# Patient Record
Sex: MALE | Race: WHITE | Employment: FULL TIME | URBAN - METROPOLITAN AREA
[De-identification: names, ages, dates, MRNs, and addresses within clinical notes are randomized per-mention and may not be internally consistent; named-entity substitution may affect disease eponyms.]

---

## 2017-09-09 ENCOUNTER — HOSPITAL ENCOUNTER (OUTPATIENT)
Dept: CT IMAGING | Age: 57
Discharge: HOME | End: 2017-09-09
Attending: SURGERY
Payer: COMMERCIAL

## 2017-09-09 DIAGNOSIS — R10.9 ABDOMINAL PAIN, UNSPECIFIED LOCATION: ICD-10-CM

## 2017-09-09 PROCEDURE — 74011636320 HC RX REV CODE- 636/320: Performed by: SURGERY

## 2017-09-09 PROCEDURE — 74177 CT ABD & PELVIS W/CONTRAST: CPT

## 2017-09-09 PROCEDURE — 74011000258 HC RX REV CODE- 258: Performed by: SURGERY

## 2017-09-09 RX ORDER — SODIUM CHLORIDE 0.9 % (FLUSH) 0.9 %
10 SYRINGE (ML) INJECTION
Status: COMPLETED | OUTPATIENT
Start: 2017-09-09 | End: 2017-09-09

## 2017-09-09 RX ADMIN — Medication 10 ML: at 09:40

## 2017-09-09 RX ADMIN — DIATRIZOATE MEGLUMINE AND DIATRIZOATE SODIUM 15 ML: 660; 100 LIQUID ORAL; RECTAL at 09:41

## 2017-09-09 RX ADMIN — SODIUM CHLORIDE 100 ML: 900 INJECTION, SOLUTION INTRAVENOUS at 09:40

## 2017-09-09 RX ADMIN — IOPAMIDOL 100 ML: 755 INJECTION, SOLUTION INTRAVENOUS at 09:40

## 2018-07-19 ENCOUNTER — APPOINTMENT (OUTPATIENT)
Dept: CT IMAGING | Age: 58
End: 2018-07-19
Attending: EMERGENCY MEDICINE
Payer: OTHER MISCELLANEOUS

## 2018-07-19 ENCOUNTER — HOSPITAL ENCOUNTER (EMERGENCY)
Age: 58
Discharge: HOME | End: 2018-07-19
Attending: EMERGENCY MEDICINE
Payer: OTHER MISCELLANEOUS

## 2018-07-19 VITALS
RESPIRATION RATE: 18 BRPM | TEMPERATURE: 98 F | SYSTOLIC BLOOD PRESSURE: 128 MMHG | WEIGHT: 180 LBS | BODY MASS INDEX: 28.93 KG/M2 | DIASTOLIC BLOOD PRESSURE: 64 MMHG | OXYGEN SATURATION: 98 % | HEART RATE: 84 BPM | HEIGHT: 66 IN

## 2018-07-19 DIAGNOSIS — S00.03XA CONTUSION OF SCALP, INITIAL ENCOUNTER: Primary | ICD-10-CM

## 2018-07-19 PROCEDURE — 99283 EMERGENCY DEPT VISIT LOW MDM: CPT | Performed by: EMERGENCY MEDICINE

## 2018-07-19 PROCEDURE — 70450 CT HEAD/BRAIN W/O DYE: CPT

## 2018-07-19 NOTE — ED PROVIDER NOTES
HPI Comments: Patient is a 59-year-old male who was at work tonight and accidentally struck his head on the trailer. There is an abrasion on the top of the head. He has a mild throbbing headache. He reports some ringing in his ear. He was sent by his employer to be evaluated for head injury. He denies any loss of consciousness. Patient is a 61 y.o. male presenting with head injury. The history is provided by the patient. Head Injury    The incident occurred 3 to 5 hours ago. He came to the ER via walk-in. The injury mechanism was a direct blow. The volume of blood lost was minimal. The quality of the pain is described as throbbing. The pain has been constant since the injury. Associated symptoms include tinnitus. Pertinent negatives include no blurred vision, no vomiting, no disorientation and no memory loss. He has tried nothing for the symptoms. There was no loss of consciousness. He has been behaving normally. Past Medical History:   Diagnosis Date    Bilateral inguinal hernia     BMI 29.0-29.9,adult     BMI 29.1       Past Surgical History:   Procedure Laterality Date    HX APPENDECTOMY      [facility]    HX ORTHOPAEDIC Right     RIGHT FOOT    HX ORTHOPAEDIC Bilateral     Thumbs         Family History:   Problem Relation Age of Onset    Pulmonary Fibrosis Mother     Lung Disease Mother     Heart Disease Father      CABG, Aortic valve replacement    No Known Problems Sister     No Known Problems Brother        Social History     Social History    Marital status:      Spouse name: N/A    Number of children: N/A    Years of education: N/A     Occupational History    Not on file.      Social History Main Topics    Smoking status: Never Smoker    Smokeless tobacco: Never Used    Alcohol use No    Drug use: No    Sexual activity: Not on file     Other Topics Concern    Not on file     Social History Narrative         ALLERGIES: Review of patient's allergies indicates no known allergies. Review of Systems   Constitutional: Negative. HENT: Positive for tinnitus. Eyes: Negative. Negative for blurred vision. Respiratory: Negative. Cardiovascular: Negative. Gastrointestinal: Negative for vomiting. Endocrine: Negative. Genitourinary: Negative. Neurological: Positive for headaches. Negative for seizures and syncope. Psychiatric/Behavioral: Negative for memory loss. Vitals:    07/19/18 0419   BP: 132/74   Pulse: 89   Resp: 16   Temp: 97.6 °F (36.4 °C)   Weight: 81.6 kg (180 lb)   Height: 5' 6\" (1.676 m)            Physical Exam   Constitutional: He is oriented to person, place, and time. He appears well-developed and well-nourished. HENT:   Head: Normocephalic. Mouth/Throat: Oropharynx is clear and moist.   Abrasion on the top of the head. Eyes: EOM are normal. Pupils are equal, round, and reactive to light. Neck: Normal range of motion. No cervical spine tenderness   Musculoskeletal: Normal range of motion. He exhibits no tenderness or deformity. Neurological: He is alert and oriented to person, place, and time. He has normal strength. No cranial nerve deficit or sensory deficit. GCS eye subscore is 4. GCS verbal subscore is 5. GCS motor subscore is 6. Nursing note and vitals reviewed. MDM  Number of Diagnoses or Management Options  Diagnosis management comments: CAT scan of the head is negative for any acute intracranial injury. Patient is reassured I will give him a note clearing him to return to work. Amount and/or Complexity of Data Reviewed  Tests in the radiology section of CPT®: ordered and reviewed    Risk of Complications, Morbidity, and/or Mortality  Presenting problems: moderate  Diagnostic procedures: moderate  Management options: low    Patient Progress  Patient progress: improved        ED Course     Voice dictation software was used during the making of this note.   This software is not perfect and grammatical and other typographical errors may be present. This note has been proofread, but may still contain errors.   Yohan Shin MD; 7/19/2018 @5:33 AM   ===================================================================      Procedures

## 2018-07-19 NOTE — ED NOTES
I have reviewed discharge instructions with the patient. The patient verbalized understanding. Patient left ED via Discharge Method: ambulatory to Home with (wife). Opportunity for questions and clarification provided. Patient given 0 scripts. To continue your aftercare when you leave the hospital, you may receive an automated call from our care team to check in on how you are doing. This is a free service and part of our promise to provide the best care and service to meet your aftercare needs.  If you have questions, or wish to unsubscribe from this service please call [phone]. Thank you for Choosing our [facility] Emergency Department.

## 2018-07-19 NOTE — LETTER
[facility] EMERGENCY DEPT 
[address] [place] [id number] 
[phone] Work/School Note Date: 7/19/2018 To Whom It May concern: 
 
Maik January III was seen and treated today in the emergency room by the following provider(s): 
Attending Provider: Shon Bustos MD.   
 
Maik January III {Return to school/sport/work: 7/19/2018 Sincerely, Shon Bustos MD

## 2018-07-19 NOTE — DISCHARGE INSTRUCTIONS
Contusion: Care Instructions  Your Care Instructions    Contusion is the medical term for a bruise. It is the result of a direct blow or an impact, such as a fall. Contusions are common sports injuries. Most people think of a bruise as a black-and-blue spot. This happens when small blood vessels get torn and leak blood under the skin. But bones, muscles, and organs can also get bruised. This may damage deep tissues but not cause a bruise you can see. The doctor will do a physical exam to find the location of your contusion. You may also have tests to make sure you do not have a more serious injury, such as a broken bone or nerve damage. These may include X-rays or other imaging tests like a CT scan or MRI. Deep-tissue contusions may cause pain and swelling. But if there is no serious damage, they will often get better in a few weeks with home treatment. The doctor has checked you carefully, but problems can develop later. If you notice any problems or new symptoms, get medical treatment right away. Follow-up care is a key part of your treatment and safety. Be sure to make and go to all appointments, and call your doctor if you are having problems. It's also a good idea to know your test results and keep a list of the medicines you take. How can you care for yourself at home? · Put ice or a cold pack on the sore area for 10 to 20 minutes at a time to stop swelling. Put a thin cloth between the ice pack and your skin. · Be safe with medicines. Read and follow all instructions on the label. ¨ If the doctor gave you a prescription medicine for pain, take it as prescribed. ¨ If you are not taking a prescription pain medicine, ask your doctor if you can take an over-the-counter medicine. · If you can, prop up the sore area on pillows as much as possible for the next few days. Try to keep the sore area above the level of your heart. When should you call for help?   Call your doctor now or seek immediate medical care if:    · Your pain gets worse.     · You have new or worse swelling.     · You have tingling, weakness, or numbness in the area near the contusion.     · The area near the contusion is cold or pale.    Watch closely for changes in your health, and be sure to contact your doctor if:    · You do not get better as expected. Where can you learn more? Go to http://haseeb-danis.info/. Enter G980 in the search box to learn more about \"Contusion: Care Instructions. \"  Current as of: November 20, 2017  Content Version: 11.7  © 6552-8657 [employer]. Care instructions adapted under license by [employer] (which disclaims liability or warranty for this information). If you have questions about a medical condition or this instruction, always ask your healthcare professional. [address] any warranty or liability for your use of this information.

## 2019-11-27 ENCOUNTER — HOSPITAL ENCOUNTER (EMERGENCY)
Age: 59
Discharge: HOME | End: 2019-11-27
Payer: COMMERCIAL

## 2019-11-27 ENCOUNTER — APPOINTMENT (OUTPATIENT)
Dept: GENERAL RADIOLOGY | Age: 59
End: 2019-11-27
Payer: COMMERCIAL

## 2019-11-27 VITALS
SYSTOLIC BLOOD PRESSURE: 151 MMHG | RESPIRATION RATE: 16 BRPM | OXYGEN SATURATION: 96 % | DIASTOLIC BLOOD PRESSURE: 74 MMHG | WEIGHT: 185 LBS | HEART RATE: 59 BPM | HEIGHT: 66 IN | BODY MASS INDEX: 29.73 KG/M2 | TEMPERATURE: 97.7 F
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
## 2019-11-27 DIAGNOSIS — S60.221A CONTUSION OF RIGHT HAND, INITIAL ENCOUNTER: Primary | ICD-10-CM

## 2019-11-27 DIAGNOSIS — S00.83XA CONTUSION OF FACE, INITIAL ENCOUNTER: ICD-10-CM

## 2019-11-27 PROCEDURE — 99283 EMERGENCY DEPT VISIT LOW MDM: CPT

## 2019-11-27 PROCEDURE — 73130 X-RAY EXAM OF HAND: CPT

## 2019-11-27 NOTE — ED NOTES
I have reviewed discharge instructions with the patient. The patient verbalized understanding. Patient left ED via Discharge Method: ambulatory to Home with  self). Opportunity for questions and clarification provided. Patient given 0 scripts. To continue your aftercare when you leave the hospital, you may receive an automated call from our care team to check in on how you are doing. This is a free service and part of our promise to provide the best care and service to meet your aftercare needs.  If you have questions, or wish to unsubscribe from this service please call [phone]. Thank you for Choosing our [facility] Emergency Department.

## 2019-11-27 NOTE — DISCHARGE INSTRUCTIONS
Patient Education        Hand Bruises: Care Instructions  Your Care Instructions  Bruises, or contusions, can happen as a result of an impact or fall. Most people think of a bruise as a black-and-blue spot. This happens when small blood vessels get torn and leak blood under the skin. The bruise may turn purplish black, reddish blue, or yellowish green as it heals. But bones and muscles can also get bruised. This may damage the hand but not cause a bruise that you can see. Most bruises aren't serious and will go away on their own in 2 to 4 weeks. But sometimes a more serious hand injury might not heal on its own. Tell your doctor if you have new symptoms or your injury is not getting better over time. You may have tests to see if you have bone or nerve damage. These tests may include X-rays, a CT scan, or an MRI. If you damaged bones or muscles, you may need more treatment. The doctor has checked you carefully, but problems can develop later. If you notice any problems or new symptoms, get medical treatment right away. Follow-up care is a key part of your treatment and safety. Be sure to make and go to all appointments, and call your doctor if you are having problems. It's also a good idea to know your test results and keep a list of the medicines you take. How can you care for yourself at home? · Put ice or a cold pack on the hand for 10 to 20 minutes at a time. Put a thin cloth between the ice and your skin. · Prop up your hand on a pillow when you ice it or anytime you sit or lie down during the next 3 days. Try to keep your hand above the level of your heart. This will help reduce swelling. · Be safe with medicines. Read and follow all instructions on the label. ? If the doctor gave you a prescription medicine for pain, take it as prescribed. ? If you are not taking a prescription pain medicine, ask your doctor if you can take an over-the-counter medicine.   · Be sure to follow your doctor's advice about moving and exercising your injured hand. When should you call for help? Call your doctor now or seek immediate medical care if:    · Your pain gets worse.     · You have new or worse swelling.     · You have tingling, weakness, or numbness in the area near the bruise.     · The area near the bruise is cold or pale.     · You have symptoms of infection, such as:  ? Increased pain, swelling, warmth, or redness. ? Red streaks leading from the area. ? Pus draining from the area. ? A fever.    Watch closely for changes in your health, and be sure to contact your doctor if:    · You do not get better as expected. Where can you learn more? Go to http://haseeb-danis.info/. Enter H134 in the search box to learn more about \"Hand Bruises: Care Instructions. \"  Current as of: June 26, 2019  Content Version: 12.2  © 7147-1623 [employer]. Care instructions adapted under license by [employer] (which disclaims liability or warranty for this information). If you have questions about a medical condition or this instruction, always ask your healthcare professional. [place] [address] any warranty or liability for your use of this information. Patient Education        Preventing Outdoor Falls: Care Instructions  Your Care Instructions    Worries about falls don't need to keep you indoors. Outdoor activities like walking have big benefits for your health. You will need to watch your step and learn a few safety measures. If you are worried about having a fall outdoors, ask your doctor about exercises, classes, or physical therapy that may help. You can learn ways to gain strength, flexibility, and balance. Ask if it might help to use a cane or walker. You can make your time outdoors safer with a few simple measures. Follow-up care is a key part of your treatment and safety.  Be sure to make and go to all appointments, and call your doctor if you are having problems. It's also a good idea to know your test results and keep a list of the medicines you take. How can you prevent falls outdoors? · Wear shoes with firm soles and low heels. If you have to walk on an icy surface, use grippers that can be worn over your shoes in bad weather. · Be extra careful if weather is bad. Walk on the grass when the sidewalks are slick. If you live in a place that gets snow and ice in the winter, sprinkle salt on slippery stairs and sidewalks. · Be careful getting on or off buses and trains or getting in and out of cars. If handrails are available, use them. · Be careful when you cross the street. Look for crosswalks or places where curb cuts or ramps are present. · Try not to hurry, especially if you are carrying something. · Be cautious in parking lots or garages. There may be curbs or changes in pavement, or the height of the pavement may vary. · Make sure to wear the correct eyeglasses, if you need them. Reading glasses or bifocals can make it harder to see hazards that might be in your way. · If you are walking outdoors for exercise, try to:  ? Walk in well-lighted, well-maintained areas. These include high school or college tracks, shopping malls, and public spaces. ? Walk with a partner. ? Watch out for cracked sidewalks, curbs, changes in the height of the pavement, exposed tree roots, and debris such as fallen leaves or branches. Where can you learn more? Go to http://haseeb-danis.info/. Enter T770 in the search box to learn more about \"Preventing Outdoor Falls: Care Instructions. \"  Current as of: November 7, 2018  Content Version: 12.2  © 0614-3593 [employer]. Care instructions adapted under license by [employer] (which disclaims liability or warranty for this information).  If you have questions about a medical condition or this instruction, always ask your healthcare professional. [place] [address] any warranty or liability for your use of this information.

## 2019-11-27 NOTE — ED TRIAGE NOTES
Pt states that he tripped and fell tonight.   C/o right hand pain with abrasions and right sided facial pain with abrasions

## 2019-11-27 NOTE — ED PROVIDER NOTES
76-year-old male tripped in the parking lot leaving work falling on his right hand he is left-hand dominant and hitting his right cheek on the parking lot asphalt. No loss of consciousness abrasions or bleedings under control. Patient's main complaint is tightness and pain in his right hand. Fall   The accident occurred less than 1 hour ago. The fall occurred while walking. He fell from a height of ground level. He landed on concrete. The volume of blood lost was minimal. The point of impact was the head. The pain is present in the head (Right hand). The pain is at a severity of 6/10. The pain is moderate. He was ambulatory at the scene. Pertinent negatives include no numbness, no headaches, no extremity weakness, no loss of consciousness and no tingling.         Past Medical History:   Diagnosis Date    Bilateral inguinal hernia     BMI 29.0-29.9,adult     BMI 29.1       Past Surgical History:   Procedure Laterality Date    HX APPENDECTOMY      [facility]    HX ORTHOPAEDIC Right     RIGHT FOOT    HX ORTHOPAEDIC Bilateral     Thumbs         Family History:   Problem Relation Age of Onset    Pulmonary Fibrosis Mother     Lung Disease Mother     Heart Disease Father         CABG, Aortic valve replacement    No Known Problems Sister     No Known Problems Brother        Social History     Socioeconomic History    Marital status:      Spouse name: Not on file    Number of children: Not on file    Years of education: Not on file    Highest education level: Not on file   Occupational History    Not on file   Social Needs    Financial resource strain: Not on file    Food insecurity:     Worry: Not on file     Inability: Not on file    Transportation needs:     Medical: Not on file     Non-medical: Not on file   Tobacco Use    Smoking status: Never Smoker    Smokeless tobacco: Never Used   Substance and Sexual Activity    Alcohol use: No    Drug use: No    Sexual activity: Not on file   Lifestyle    Physical activity:     Days per week: Not on file     Minutes per session: Not on file    Stress: Not on file   Relationships    Social connections:     Talks on phone: Not on file     Gets together: Not on file     Attends [employer]ism service: Not on file     Active member of club or organization: Not on file     Attends meetings of clubs or organizations: Not on file     Relationship status: Not on file    Intimate partner violence:     Fear of current or ex partner: Not on file     Emotionally abused: Not on file     Physically abused: Not on file     Forced sexual activity: Not on file   Other Topics Concern    Not on file   Social History Narrative    Not on file         ALLERGIES: Patient has no known allergies. Review of Systems   Constitutional: Negative. Negative for activity change. HENT: Negative. Eyes: Negative. Respiratory: Negative. Cardiovascular: Negative. Gastrointestinal: Negative. Genitourinary: Negative. Musculoskeletal: Negative. Negative for extremity weakness. Skin: Negative. Neurological: Negative. Negative for tingling, loss of consciousness, numbness and headaches. Psychiatric/Behavioral: Negative. All other systems reviewed and are negative. Vitals:    11/27/19 0416   BP: 151/74   Pulse: (!) 59   Resp: 16   Temp: 97.7 °F (36.5 °C)   SpO2: 96%   Weight: 83.9 kg (185 lb)   Height: 5' 6\" (1.676 m)            Physical Exam  Vitals signs and nursing note reviewed. Constitutional:       General: He is not in acute distress. Appearance: He is well-developed. He is not diaphoretic. HENT:      Head: Normocephalic. Abrasion present. Right Ear: External ear normal.      Left Ear: External ear normal.      Nose: Nose normal.      Mouth/Throat:      Pharynx: No oropharyngeal exudate. Eyes:      General: No scleral icterus. Right eye: No discharge. Left eye: No discharge. Conjunctiva/sclera: Conjunctivae normal.      Pupils: Pupils are equal, round, and reactive to light. Neck:      Musculoskeletal: Normal range of motion and neck supple. Vascular: No JVD. Trachea: No tracheal deviation. Cardiovascular:      Rate and Rhythm: Normal rate and regular rhythm. Pulmonary:      Effort: Pulmonary effort is normal. No respiratory distress. Breath sounds: Normal breath sounds. No stridor. No wheezing. Chest:      Chest wall: No tenderness. Abdominal:      General: Bowel sounds are normal. There is no distension. Palpations: Abdomen is soft. There is no mass. Tenderness: There is no tenderness. Musculoskeletal: Normal range of motion. Right hand: He exhibits tenderness and swelling. Hands:    Skin:     General: Skin is warm and dry. Coloration: Skin is not pale. Findings: No erythema or rash. Neurological:      Mental Status: He is alert and oriented to person, place, and time. Cranial Nerves: No cranial nerve deficit. Psychiatric:         Behavior: Behavior normal.         Thought Content: Thought content normal.          MDM  Number of Diagnoses or Management Options  Contusion of face, initial encounter: minor  Contusion of right hand, initial encounter: minor  Fall, initial encounter: minor  Diagnosis management comments: Patient had a trip and fall he did not have any loss of consciousness he did not feel dizzy before the fall. Patient landed on his right hand and his right cheek he has abrasions at both spots ulcers come swelling. X-rays of the hand were negative for fractures. Extraocular movements are intact no instability noted in facial bones.        Amount and/or Complexity of Data Reviewed  Tests in the radiology section of CPT®: ordered and reviewed    Risk of Complications, Morbidity, and/or Mortality  Presenting problems: low  Diagnostic procedures: low  Management options: low    Patient Progress  Patient progress: stable         Procedures

## 2020-11-24 ENCOUNTER — HOSPITAL ENCOUNTER (OUTPATIENT)
Dept: ULTRASOUND IMAGING | Age: 60
Discharge: HOME | End: 2020-11-24
Attending: FAMILY MEDICINE
Payer: COMMERCIAL

## 2020-11-24 DIAGNOSIS — D69.6 THROMBOCYTOPENIA (HCC): ICD-10-CM

## 2020-11-24 DIAGNOSIS — R74.8 ABNORMAL LIVER ENZYMES: ICD-10-CM

## 2020-11-24 PROCEDURE — 76700 US EXAM ABDOM COMPLETE: CPT

## 2020-11-24 NOTE — PROGRESS NOTES
Note that he has A LARGE NUMBER OF HEPATIC CYSTS WHICH ARE SEPTATED---GALLSTONES PRESENT----FATTY LIVER-----I RECOMMEND GI REFERRAL-----I CAN GO AHEAD AND DO THIS OR HE CAN COME IN TO DISCUSS FOR APPT

## 2021-02-01 ENCOUNTER — TRANSCRIBE ORDER (OUTPATIENT)
Dept: SCHEDULING | Age: 61
End: 2021-02-01

## 2021-02-01 DIAGNOSIS — R74.01 ELEVATED ALT MEASUREMENT: ICD-10-CM

## 2021-02-01 DIAGNOSIS — E80.6 OTHER DISORDERS OF BILIRUBIN METABOLISM: ICD-10-CM

## 2021-02-01 DIAGNOSIS — R74.01 ELEVATION OF LEVELS OF LIVER TRANSAMINASE LEVELS: Primary | ICD-10-CM

## 2021-02-01 DIAGNOSIS — R93.2 ABNORMAL ULTRASOUND OF LIVER: Primary | ICD-10-CM

## 2021-02-01 DIAGNOSIS — R93.2 ABNORMAL FINDINGS ON DIAGNOSTIC IMAGING OF LIVER AND BILIARY TRACT: ICD-10-CM

## 2021-02-02 ENCOUNTER — TRANSCRIBE ORDER (OUTPATIENT)
Dept: SCHEDULING | Age: 61
End: 2021-02-02

## 2021-02-02 DIAGNOSIS — R93.2 ABNORMAL ULTRASOUND OF LIVER: Primary | ICD-10-CM

## 2021-02-02 DIAGNOSIS — R74.01 ELEVATED ALT MEASUREMENT: ICD-10-CM

## 2021-02-10 ENCOUNTER — HOSPITAL ENCOUNTER (OUTPATIENT)
Dept: MRI IMAGING | Age: 61
Discharge: HOME | End: 2021-02-10
Attending: PHYSICIAN ASSISTANT
Payer: COMMERCIAL

## 2021-02-10 DIAGNOSIS — R74.01 ELEVATED ALT MEASUREMENT: ICD-10-CM

## 2021-02-10 DIAGNOSIS — R93.2 ABNORMAL ULTRASOUND OF LIVER: ICD-10-CM

## 2021-02-10 PROCEDURE — A9575 INJ GADOTERATE MEGLUMI 0.1ML: HCPCS | Performed by: PHYSICIAN ASSISTANT

## 2021-02-10 PROCEDURE — 74011250636 HC RX REV CODE- 250/636: Performed by: PHYSICIAN ASSISTANT

## 2021-02-10 PROCEDURE — 74011000258 HC RX REV CODE- 258: Performed by: PHYSICIAN ASSISTANT

## 2021-02-10 PROCEDURE — 74183 MRI ABD W/O CNTR FLWD CNTR: CPT

## 2021-02-10 RX ORDER — GADOTERATE MEGLUMINE 376.9 MG/ML
16 INJECTION INTRAVENOUS
Status: COMPLETED | OUTPATIENT
Start: 2021-02-10 | End: 2021-02-10

## 2021-02-10 RX ORDER — SODIUM CHLORIDE 0.9 % (FLUSH) 0.9 %
10 SYRINGE (ML) INJECTION
Status: COMPLETED | OUTPATIENT
Start: 2021-02-10 | End: 2021-02-10

## 2021-02-10 RX ADMIN — SODIUM CHLORIDE 100 ML: 900 INJECTION, SOLUTION INTRAVENOUS at 10:48

## 2021-02-10 RX ADMIN — Medication 10 ML: at 10:48

## 2021-02-10 RX ADMIN — GADOTERATE MEGLUMINE 16 ML: 376.9 INJECTION INTRAVENOUS at 10:48

## 2021-07-22 PROBLEM — Z12.11 ENCOUNTER FOR SCREENING FOR MALIGNANT NEOPLASM OF COLON: Status: ACTIVE | Noted: 2019-02-12

## 2021-10-27 ENCOUNTER — HOSPITAL ENCOUNTER (OUTPATIENT)
Dept: GENERAL RADIOLOGY | Age: 61
Discharge: HOME | End: 2021-10-27
Payer: COMMERCIAL

## 2021-10-27 DIAGNOSIS — R20.0 NUMBNESS AND TINGLING OF LEFT LEG: ICD-10-CM

## 2021-10-27 DIAGNOSIS — R20.2 NUMBNESS AND TINGLING OF LEFT LEG: ICD-10-CM

## 2021-10-27 PROCEDURE — 72110 X-RAY EXAM L-2 SPINE 4/>VWS: CPT

## 2021-10-27 NOTE — PROGRESS NOTES
MILD DEGENERATIVE ARTHRITIS IN THE LOWER SPINE===MAKE SURE TO KEEP FOLOW UP IN 6 WEEKS IF STILL HAVING PROBLEMS WITH BACK AS WE MAY NEED TO DO FURTHER STUDIES.

## 2021-11-17 ENCOUNTER — HOSPITAL ENCOUNTER (OUTPATIENT)
Dept: MRI IMAGING | Age: 61
Discharge: HOME | End: 2021-11-17
Attending: FAMILY MEDICINE
Payer: COMMERCIAL

## 2021-11-17 DIAGNOSIS — R20.0 NUMBNESS AND TINGLING OF LEFT LEG: ICD-10-CM

## 2021-11-17 DIAGNOSIS — M54.16 LUMBAR RADICULOPATHY: ICD-10-CM

## 2021-11-17 DIAGNOSIS — M51.37 DEGENERATIVE DISC DISEASE AT L5-S1 LEVEL: ICD-10-CM

## 2021-11-17 DIAGNOSIS — R20.2 NUMBNESS AND TINGLING OF LEFT LEG: ICD-10-CM

## 2021-11-17 PROCEDURE — 72148 MRI LUMBAR SPINE W/O DYE: CPT

## 2022-02-09 PROBLEM — R53.83 OTHER FATIGUE: Status: ACTIVE | Noted: 2022-02-09

## 2022-02-09 PROBLEM — R00.1 BRADYCARDIA ON ECG: Status: ACTIVE | Noted: 2022-02-09

## 2022-02-09 PROBLEM — R00.1 BRADYCARDIA: Status: ACTIVE | Noted: 2022-02-09

## 2022-02-09 PROBLEM — R94.31 EKG ABNORMALITIES: Status: ACTIVE | Noted: 2022-02-09

## 2022-02-09 PROBLEM — Z82.49 FAMILY HISTORY OF ISCHEMIC HEART DISEASE (IHD): Status: ACTIVE | Noted: 2022-02-09

## 2022-02-24 ENCOUNTER — HOSPITAL ENCOUNTER (OUTPATIENT)
Dept: CT IMAGING | Age: 62
Discharge: HOME | End: 2022-02-24
Attending: INTERNAL MEDICINE
Payer: COMMERCIAL

## 2022-02-24 DIAGNOSIS — R53.83 OTHER FATIGUE: ICD-10-CM

## 2022-02-24 DIAGNOSIS — R94.31 EKG ABNORMALITIES: ICD-10-CM

## 2022-02-24 PROCEDURE — 75571 CT HRT W/O DYE W/CA TEST: CPT

## 2022-02-25 NOTE — PROGRESS NOTES
PLease call him, Ca score showed some build up, moderate, not critical.  Would like him to get on ASA 81 daily. Cancel the stress echo, we need a better stress test.   Let's order a NST and echo. See me after testing.    Thanks

## 2022-03-18 PROBLEM — Z82.49 FAMILY HISTORY OF ISCHEMIC HEART DISEASE (IHD): Status: ACTIVE | Noted: 2022-02-09

## 2022-03-18 PROBLEM — R00.1 BRADYCARDIA: Status: ACTIVE | Noted: 2022-02-09

## 2022-03-19 PROBLEM — Z12.11 ENCOUNTER FOR SCREENING FOR MALIGNANT NEOPLASM OF COLON: Status: ACTIVE | Noted: 2019-02-12

## 2022-03-19 PROBLEM — R94.31 EKG ABNORMALITIES: Status: ACTIVE | Noted: 2022-02-09

## 2022-03-19 PROBLEM — R53.83 OTHER FATIGUE: Status: ACTIVE | Noted: 2022-02-09

## 2022-03-19 PROBLEM — R00.1 BRADYCARDIA ON ECG: Status: ACTIVE | Noted: 2022-02-09

## 2022-05-19 PROBLEM — I25.10 CORONARY ARTERY DISEASE INVOLVING NATIVE CORONARY ARTERY OF NATIVE HEART WITHOUT ANGINA PECTORIS: Status: ACTIVE | Noted: 2022-05-19

## 2022-07-12 ENCOUNTER — OFFICE VISIT (OUTPATIENT)
Dept: FAMILY MEDICINE CLINIC | Facility: CLINIC | Age: 62
End: 2022-07-12
Payer: COMMERCIAL

## 2022-07-12 VITALS
SYSTOLIC BLOOD PRESSURE: 130 MMHG | BODY MASS INDEX: 31.76 KG/M2 | WEIGHT: 197.6 LBS | OXYGEN SATURATION: 96 % | HEART RATE: 57 BPM | DIASTOLIC BLOOD PRESSURE: 78 MMHG | HEIGHT: 66 IN | TEMPERATURE: 97.3 F

## 2022-07-12 DIAGNOSIS — M79.641 BILATERAL HAND PAIN: ICD-10-CM

## 2022-07-12 DIAGNOSIS — L08.9 SKIN INFECTION: ICD-10-CM

## 2022-07-12 DIAGNOSIS — R06.09 DOE (DYSPNEA ON EXERTION): Primary | ICD-10-CM

## 2022-07-12 DIAGNOSIS — S20.161A INSECT BITE OF RIGHT BREAST, INITIAL ENCOUNTER: ICD-10-CM
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
## 2022-07-12 DIAGNOSIS — E78.49 OTHER HYPERLIPIDEMIA: ICD-10-CM

## 2022-07-12 DIAGNOSIS — R00.1 BRADYCARDIA: ICD-10-CM

## 2022-07-12 DIAGNOSIS — M79.642 BILATERAL HAND PAIN: ICD-10-CM

## 2022-07-12 LAB
ALBUMIN SERPL-MCNC: 4 G/DL (ref 3.2–4.6)
ALBUMIN/GLOB SERPL: 1.5 {RATIO} (ref 1.2–3.5)
ALP SERPL-CCNC: 130 U/L (ref 50–136)
ALT SERPL-CCNC: 74 U/L (ref 12–65)
ANION GAP SERPL CALC-SCNC: 4 MMOL/L (ref 7–16)
AST SERPL-CCNC: 28 U/L (ref 15–37)
BASOPHILS # BLD: 0 K/UL (ref 0–0.2)
BASOPHILS NFR BLD: 1 % (ref 0–2)
BILIRUB SERPL-MCNC: 1.4 MG/DL (ref 0.2–1.1)
BUN SERPL-MCNC: 23 MG/DL (ref 8–23)
CALCIUM SERPL-MCNC: 9.6 MG/DL (ref 8.3–10.4)
CHLORIDE SERPL-SCNC: 111 MMOL/L (ref 98–107)
CHOLEST SERPL-MCNC: 191 MG/DL
CO2 SERPL-SCNC: 27 MMOL/L (ref 21–32)
CREAT SERPL-MCNC: 1.1 MG/DL (ref 0.8–1.5)
DIFFERENTIAL METHOD BLD: ABNORMAL
EOSINOPHIL # BLD: 0.1 K/UL (ref 0–0.8)
EOSINOPHIL NFR BLD: 1 % (ref 0.5–7.8)
ERYTHROCYTE [DISTWIDTH] IN BLOOD BY AUTOMATED COUNT: 12.8 % (ref 11.9–14.6)
GLOBULIN SER CALC-MCNC: 2.7 G/DL (ref 2.3–3.5)
GLUCOSE SERPL-MCNC: 90 MG/DL (ref 65–100)
HCT VFR BLD AUTO: 48.5 % (ref 41.1–50.3)
HDLC SERPL-MCNC: 75 MG/DL (ref 40–60)
HDLC SERPL: 2.5 {RATIO}
HGB BLD-MCNC: 16.2 G/DL (ref 13.6–17.2)
IMM GRANULOCYTES # BLD AUTO: 0 K/UL (ref 0–0.5)
IMM GRANULOCYTES NFR BLD AUTO: 1 % (ref 0–5)
LDLC SERPL CALC-MCNC: 95.8 MG/DL
LYMPHOCYTES # BLD: 1.6 K/UL (ref 0.5–4.6)
LYMPHOCYTES NFR BLD: 29 % (ref 13–44)
MCH RBC QN AUTO: 29.9 PG (ref 26.1–32.9)
MCHC RBC AUTO-ENTMCNC: 33.4 G/DL (ref 31.4–35)
MCV RBC AUTO: 89.5 FL (ref 79.6–97.8)
MONOCYTES # BLD: 0.6 K/UL (ref 0.1–1.3)
MONOCYTES NFR BLD: 10 % (ref 4–12)
NEUTS SEG # BLD: 3.2 K/UL (ref 1.7–8.2)
NEUTS SEG NFR BLD: 58 % (ref 43–78)
NRBC # BLD: 0 K/UL (ref 0–0.2)
PLATELET # BLD AUTO: 136 K/UL (ref 150–450)
PMV BLD AUTO: 12.6 FL (ref 9.4–12.3)
POTASSIUM SERPL-SCNC: 4.5 MMOL/L (ref 3.5–5.1)
PROT SERPL-MCNC: 6.7 G/DL (ref 6.3–8.2)
RBC # BLD AUTO: 5.42 M/UL (ref 4.23–5.6)
SODIUM SERPL-SCNC: 142 MMOL/L (ref 138–145)
TRIGL SERPL-MCNC: 101 MG/DL (ref 35–150)
VLDLC SERPL CALC-MCNC: 20.2 MG/DL (ref 6–23)
WBC # BLD AUTO: 5.6 K/UL (ref 4.3–11.1)

## 2022-07-12 PROCEDURE — 99214 OFFICE O/P EST MOD 30 MIN: CPT | Performed by: FAMILY MEDICINE

## 2022-07-12 RX ORDER — MUPIROCIN CALCIUM 20 MG/G
CREAM TOPICAL
Qty: 1 EACH | Refills: 0 | Status: SHIPPED | OUTPATIENT
Start: 2022-07-12 | End: 2022-08-11

## 2022-07-12 RX ORDER — DOXYCYCLINE HYCLATE 100 MG
100 TABLET ORAL 2 TIMES DAILY
Qty: 14 TABLET | Refills: 0 | Status: SHIPPED | OUTPATIENT
Start: 2022-07-12 | End: 2022-07-19

## 2022-07-12 ASSESSMENT — PATIENT HEALTH QUESTIONNAIRE - PHQ9
SUM OF ALL RESPONSES TO PHQ QUESTIONS 1-9: 0
SUM OF ALL RESPONSES TO PHQ QUESTIONS 1-9: 0
SUM OF ALL RESPONSES TO PHQ9 QUESTIONS 1 & 2: 0
1. LITTLE INTEREST OR PLEASURE IN DOING THINGS: 0
SUM OF ALL RESPONSES TO PHQ QUESTIONS 1-9: 0
SUM OF ALL RESPONSES TO PHQ QUESTIONS 1-9: 0
2. FEELING DOWN, DEPRESSED OR HOPELESS: 0

## 2022-07-13 ASSESSMENT — ENCOUNTER SYMPTOMS
SORE THROAT: 0
BLOOD IN STOOL: 0
PHOTOPHOBIA: 0
ABDOMINAL DISTENTION: 0
ABDOMINAL PAIN: 0
SHORTNESS OF BREATH: 1
EYE PAIN: 0
COUGH: 0
NAUSEA: 0
COLOR CHANGE: 0

## 2022-07-13 NOTE — PROGRESS NOTES
Valerie Pierce III  1960 is a 57 y.o. male ,Established patient, here for evaluation of the following chief complaint(s):   Chief Complaint   Patient presents with    3 Month Follow-Up    Cholesterol Problem     pt is not fasting    Hand Pain     having left hand pain no injury to the hand pt states he thinks its arthritis describes it as a burning pain    Shortness of Breath     for about a week now he has had some sob while walking going up stairs pt thinks maybe its the weight he has put on but he does have a cough at times with this          1. VELASQUEZ (dyspnea on exertion)-has had complete cardiac work up recently and this has been negative-- no know exposures to dust or chemicals --WILL DO CXRAY AND REFER TO PULMONARY AS HE MAY NEED PFTS AND FURTHER EVALUATION. -     XR CHEST PA LAT (2 VIEWS); Future  -     CBC with Auto Differential; Future  -     Comprehensive Metabolic Panel; Future  -     Vero Madrid MD, Pulmonology, [facility]  2. Other hyperlipidemia  -     Lipid Panel; Future  3. Bilateral hand pain  -     XR HAND LEFT PA; Future  -     XR HAND RIGHT PA; Future  4. Insect bite of right breast, initial encounter  -     doxycycline hyclate (VIBRA-TABS) 100 MG tablet; Take 1 tablet by mouth 2 times daily for 7 days, Disp-14 tablet, R-0Normal  -     mupirocin (BACTROBAN) 2 % cream; Apply 3 times daily. , Disp-1 each, R-0, Normal  5. Skin infection  -     doxycycline hyclate (VIBRA-TABS) 100 MG tablet; Take 1 tablet by mouth 2 times daily for 7 days, Disp-14 tablet, R-0Normal  -     mupirocin (BACTROBAN) 2 % cream; Apply 3 times daily. , Disp-1 each, R-0, Normal  6. Bradycardia  Assessment & Plan:   Monitored by specialist- no acute findings meriting change in the plan--HE HAS PLQ BURDEN 295 LVEF 67 PERCENT LEFT VENTRICULAR PERFUSION NORMAL        Return in about 3 months (around 10/12/2022).         Subjective   SUBJECTIVE/OBJECTIVE:  An insect bit him on side of chest. It is now red.    Hyperlipidemia  This is a chronic problem. The current episode started more than 1 year ago. The problem is controlled. Recent lipid tests were reviewed and are normal. He has no history of chronic renal disease or diabetes. There are no known factors aggravating his hyperlipidemia. Associated symptoms include shortness of breath. Pertinent negatives include no chest pain or myalgias. The current treatment provides mild improvement of lipids. Hand Pain   The incident occurred more than 1 week ago (both hands no incident). There was no injury mechanism. The quality of the pain is described as aching. The pain is at a severity of 4/10. The pain is moderate. The pain has been fluctuating since the incident. Pertinent negatives include no chest pain or muscle weakness. The symptoms are aggravated by movement. He has tried nothing for the symptoms. The treatment provided no relief. Shortness of Breath  Pertinent negatives include no abdominal pain, chest pain, ear pain, fever, headaches, leg swelling, rash or sore throat. Review of Systems   Constitutional: Negative for chills and fever. HENT: Negative for ear pain, hearing loss and sore throat. Eyes: Negative for photophobia and pain. Respiratory: Positive for shortness of breath. Negative for cough. Cardiovascular: Negative for chest pain, palpitations and leg swelling. Gastrointestinal: Negative for abdominal distention, abdominal pain, blood in stool and nausea. Genitourinary: Negative for dysuria and urgency. Musculoskeletal: Negative for joint swelling and myalgias. Skin: Negative for color change, pallor and rash. Neurological: Negative for speech difficulty, weakness, light-headedness and headaches. Hematological: Negative for adenopathy. Psychiatric/Behavioral: Negative for agitation, confusion, hallucinations, self-injury and suicidal ideas.           Objective   Physical Exam  Constitutional:       Appearance: Normal appearance. HENT:      Head: Normocephalic and atraumatic. Nose: Nose normal.   Eyes:      Extraocular Movements: Extraocular movements intact. Conjunctiva/sclera: Conjunctivae normal.      Pupils: Pupils are equal, round, and reactive to light. Cardiovascular:      Rate and Rhythm: Normal rate and regular rhythm. Pulses: Normal pulses. Heart sounds: Normal heart sounds. Pulmonary:      Effort: Pulmonary effort is normal.      Breath sounds: Normal breath sounds. Abdominal:      General: Abdomen is flat. Bowel sounds are normal.      Palpations: Abdomen is soft. Skin:     General: Skin is warm and dry. Neurological:      General: No focal deficit present. Mental Status: He is alert and oriented to person, place, and time. Psychiatric:         Mood and Affect: Mood normal.                   An electronic signature was used to authenticate this note.     --Luis Alberto Alvarez MD

## 2022-07-13 NOTE — ASSESSMENT & PLAN NOTE
Monitored by specialist- no acute findings meriting change in the plan--HE HAS PLQ BURDEN 295 LVEF 67 PERCENT LEFT VENTRICULAR PERFUSION NORMAL
Abdomen soft, non-tender, gravid uterus. feeling baby move

## 2022-07-14 ENCOUNTER — HOSPITAL ENCOUNTER (OUTPATIENT)
Dept: GENERAL RADIOLOGY | Age: 62
Discharge: HOME | End: 2022-07-17
Payer: COMMERCIAL

## 2022-07-14 DIAGNOSIS — M79.642 BILATERAL HAND PAIN: ICD-10-CM

## 2022-07-14 DIAGNOSIS — R06.09 DOE (DYSPNEA ON EXERTION): ICD-10-CM

## 2022-07-14 DIAGNOSIS — M79.641 BILATERAL HAND PAIN: ICD-10-CM

## 2022-07-14 PROCEDURE — 71046 X-RAY EXAM CHEST 2 VIEWS: CPT

## 2022-07-14 PROCEDURE — 73120 X-RAY EXAM OF HAND: CPT

## 2022-08-26 ENCOUNTER — OFFICE VISIT (OUTPATIENT)
Dept: PULMONOLOGY | Age: 62
End: 2022-08-26
Payer: COMMERCIAL

## 2022-08-26 VITALS
BODY MASS INDEX: 31.18 KG/M2 | HEART RATE: 58 BPM | SYSTOLIC BLOOD PRESSURE: 134 MMHG | TEMPERATURE: 97.4 F | DIASTOLIC BLOOD PRESSURE: 74 MMHG | HEIGHT: 66 IN | OXYGEN SATURATION: 99 % | WEIGHT: 194 LBS

## 2022-08-26 DIAGNOSIS — R06.02 SOB (SHORTNESS OF BREATH): Primary | ICD-10-CM

## 2022-08-26 LAB
EXPIRATORY TIME: NORMAL
FEF 25-75% %PRED-PRE: NORMAL
FEF 25-75% PRED: NORMAL
FEF 25-75%-PRE: NORMAL
FEV1 %PRED-PRE: 64 %
FEV1 PRED: NORMAL
FEV1/FVC %PRED-PRE: NORMAL
FEV1/FVC PRED: NORMAL
FEV1/FVC: 74 %
FEV1: 1.93 L
FVC %PRED-PRE: 64 %
FVC PRED: NORMAL
FVC: 2.6 L
PEF %PRED-PRE: NORMAL
PEF PRED: NORMAL
PEF-PRE: NORMAL

## 2022-08-26 PROCEDURE — 94010 BREATHING CAPACITY TEST: CPT | Performed by: INTERNAL MEDICINE

## 2022-08-26 PROCEDURE — 99204 OFFICE O/P NEW MOD 45 MIN: CPT | Performed by: INTERNAL MEDICINE

## 2022-08-26 ASSESSMENT — PULMONARY FUNCTION TESTS
FEV1_PERCENT_PREDICTED_PRE: 64
FEV1/FVC: 74
FEV1: 1.93
FVC: 2.60
FVC_PERCENT_PREDICTED_PRE: 64

## 2022-08-26 NOTE — PROGRESS NOTES
[address] [facility]. [address], [address] [facility]  [phone]    Patient Name:  Conchita Khalil  YOB: 1960      Office Visit 2022    CHIEF COMPLAINT:    Chief Complaint   Patient presents with    New Patient    Shortness of Breath       HISTORY OF PRESENT ILLNESS:        This is a very pleasant 76-year-old white male with no past pulmonary history, never smoker who presents for evaluation of subjective sensation of dyspnea on exertion. He tells me that about 6 months ago he had noted worsening dyspnea on exertion on more than usual activity noticed this first while moving furniture with his father apparently this was indoors, he went to his primary doctor who upon further questioning referred him to us for further evaluation. The patient tells me that at times she has cough associated with this. He denies any wheezing, hemoptysis, unintentional weight loss in fact he had gained about 15 pounds in the past 1 to 2 years unintentionally. He does have a family history of pulmonary fibrosis where his mother suffered from this illness and  from it. As I mentioned he denies any chronic pulmonary problems, there is no known history of exposure to tuberculosis, asbestos, he has 2 dogs but no birds. He does not drink alcohol at all.   Chest x-ray performed shows no active pulmonary disease, office spirometry reveals moderate restrictive defect  Past Medical History:   Diagnosis Date    Bilateral inguinal hernia     BMI 29.0-29.9,adult     BMI 29.1         Patient Active Problem List   Diagnosis    Bradycardia    Family history of ischemic heart disease (IHD)    Encounter for screening for malignant neoplasm of colon    Bradycardia on ECG    Other fatigue    EKG abnormalities    Coronary artery disease involving native coronary artery of native heart without angina pectoris           Past Surgical History:   Procedure Laterality Date    APPENDECTOMY HERNIA REPAIR  2016    [facility]    ORTHOPEDIC SURGERY Right     RIGHT FOOT    ORTHOPEDIC SURGERY Bilateral     Thumbs    ORTHOPEDIC SURGERY      Both hands, Right foot    [place] APPENDECTOMY           Social History     Socioeconomic History    Marital status:      Spouse name: Not on file    Number of children: Not on file    Years of education: Not on file    Highest education level: Not on file   Occupational History    Not on file   Tobacco Use    Smoking status: Never    Smokeless tobacco: Never   Vaping Use    Vaping Use: Never used   Substance and Sexual Activity    Alcohol use: No    Drug use: Never    Sexual activity: Not on file   Other Topics Concern    Not on file   Social History Narrative    Not on file     Social Determinants of Health     Financial Resource Strain: Not on file   Food Insecurity: Not on file   Transportation Needs: Not on file   Physical Activity: Not on file   Stress: Not on file   Social Connections: Not on file   Intimate Partner Violence: Not on file   Housing Stability: Not on file         Family History   Problem Relation Age of Onset    No Known Problems Brother     Pulmonary Fibrosis Mother     No Known Problems Sister     Heart Disease Father         CABG, Aortic valve replacement    Lung Disease Mother          No Known Allergies      Current Outpatient Medications   Medication Sig    Cholecalciferol (VITAMIN D) 10 MCG (400 UNIT) CAPS Capsule Take 1 capsule once daily    finasteride (PROSCAR) 5 MG tablet Take 5 mg by mouth daily    meloxicam (MOBIC) 7.5 MG tablet Take 7.5 mg by mouth daily    silodosin (RAPAFLO) 4 MG CAPS capsule Take 4 mg by mouth daily (with breakfast)     No current facility-administered medications for this visit.            Review of Systems          PHYSICAL EXAM:    Vitals:    08/26/22 1003   BP: 134/74   Pulse: 58   Temp: 97.4 °F (36.3 °C)   SpO2: 99%        GENERAL APPEARANCE:   The patient is tightly over weight android type obesity and in no respiratory distress. HEENT:   PERRL. Conjunctivae unremarkable. Nasal mucosa is without epistaxis, exudate, or polyps. Gums and dentition are unremarkable. There is no oropharyngeal narrowing. TMs are clear. NECK/LYMPHATIC:   Symmetrical with no elevation of jugular venous pulsation. Trachea midline. No thyroid enlargement. No cervical adenopathy. LUNGS:   Normal respiratory effort with symmetrical lung expansion. Breath sounds clear to auscultation bilaterally with no rhonchi wheezing or crackles. HEART:   There is a regular rate and rhythm. No murmur, rub, or gallop. There is no edema in the lower extremities. ABDOMEN:   Soft and non-tender. No hepatosplenomegaly. Bowel sounds are normal.     SKIN:   There are no rashes, cyanosis, jaundice, or ecchymosis present. EXTREMITIES:   The extremities are unremarkable without clubbing, cyanosis, joint inflammation, degenerative, or ischemic change. MUSCULOSKELETAL:   There is no abnormal tone, muscle atrophy, or abnormal movement present. NEURO:   The patient is alert and oriented to person, place, and time. Memory appears intact and mood is normal.  No gross sensorimotor deficits are present. DIAGNOSTIC TESTS:      CXR:           Spirometry:  08/26/2022             Exercise oximetry on room air on August 26, 2022. Baseline oxygen saturation 99%, baseline heart rate 73 bpm, at peak exercise the lowest oxygen saturation noted was 96%, the highest heart rate noted was 98 bpm.    Impression:     This is a normal exercise oximetry with no evidence of exertional desaturation on room air    Corby Marcum MD.    ASSESSMENT:  (Medical Decision Making)       Patient Active Problem List   Diagnosis    Bradycardia    Family history of ischemic heart disease (IHD)    Encounter for screening for malignant neoplasm of colon    Bradycardia on ECG    Other fatigue    EKG abnormalities    Coronary artery disease involving native coronary artery of native heart without angina pectoris           PLAN:  Encounter Diagnosis   Name Primary? SOB (shortness of breath) Yes   Nonspecific dyspnea on exertion, this may be related to deconditioning, age, recent weight  gain that is nonspecific and subjective. Objectively the patient has a normal chest x-ray and he does have however some restriction on pulmonary function testing. It is important to note that the patient has a strong family history of pulmonary fibrosis with his mother dying from IPF. Clinically however on physical examination as well as on chest x-ray I do not see any evidence of pulmonary fibrosis. On clinical exam however early subtle changes may be missed and complete pulmonary function testing is in order to exclude this possibility. Therefore complete pulmonary function testing will be ordered with diffusion capacity to assess for his residual volume as well as diffusion capacity. If these are abnormal and suggest interstitial lung disease or early interstitial lung disease then a high-resolution CT scan with prone, supine, inspiratory and expiratory imaging will be performed to further assess his pulmonary parenchyma in the meantime the patient will continue to maintain as much physical activity as he can understanding that he is older and that that will put some limitation to his physical capacity it seems however that he is able to perform more than usual activity without any difficulty except for the subjective sensation of dyspnea. He will return to the office for follow-up in 3 months to discuss the results of his complete pulmonary function testing and whether we need to pursue high-resolution CT scanning of the chest or not.   Any questions asked were answered seemingly to his satisfaction,      Return for follow-up in 3 months,  Complete pulmonary function testing with diffusion capacity,    Total time spent 45 minutes    Orders Placed This Encounter   Procedures    Spirometry Without Bronchodilator       No orders of the defined types were placed in this encounter. Mik Cain MD    Dictated using voice recognition software.  Proofread, but unrecognized voice recognition errors may exist.

## 2022-08-29 RX ORDER — SILODOSIN 4 MG/1
CAPSULE ORAL
Qty: 90 CAPSULE | Refills: 0 | Status: SHIPPED | OUTPATIENT
Start: 2022-08-29 | End: 2022-08-29

## 2022-08-29 RX ORDER — SILODOSIN 4 MG/1
CAPSULE ORAL
Qty: 90 CAPSULE | Refills: 0 | Status: SHIPPED | OUTPATIENT
Start: 2022-08-29 | End: 2022-09-08 | Stop reason: SDUPTHER

## 2022-08-29 RX ORDER — FINASTERIDE 5 MG/1
TABLET, FILM COATED ORAL
Qty: 90 TABLET | Refills: 0 | Status: SHIPPED | OUTPATIENT
Start: 2022-08-29 | End: 2022-09-08 | Stop reason: SDUPTHER

## 2022-08-29 RX ORDER — FINASTERIDE 5 MG/1
TABLET, FILM COATED ORAL
Qty: 90 TABLET | Refills: 0 | Status: SHIPPED | OUTPATIENT
Start: 2022-08-29 | End: 2022-08-29

## 2022-09-06 ENCOUNTER — TELEPHONE (OUTPATIENT)
Dept: UROLOGY | Age: 62
End: 2022-09-06

## 2022-09-06 NOTE — TELEPHONE ENCOUNTER
Pt misses appt 9/6 due to his wife being in the ER. He would like to rsch asap. No availability. Please advise.

## 2022-09-08 ENCOUNTER — OFFICE VISIT (OUTPATIENT)
Dept: UROLOGY | Age: 62
End: 2022-09-08
Payer: COMMERCIAL

## 2022-09-08 DIAGNOSIS — N13.8 BENIGN PROSTATIC HYPERPLASIA WITH URINARY OBSTRUCTION: Primary | ICD-10-CM

## 2022-09-08 DIAGNOSIS — N40.1 BENIGN PROSTATIC HYPERPLASIA WITH URINARY OBSTRUCTION: Primary | ICD-10-CM

## 2022-09-08 LAB
BILIRUBIN, URINE, POC: NEGATIVE
BLOOD URINE, POC: NEGATIVE
GLUCOSE URINE, POC: 100
KETONES, URINE, POC: NEGATIVE
LEUKOCYTE ESTERASE, URINE, POC: NEGATIVE
NITRITE, URINE, POC: NEGATIVE
PH, URINE, POC: 5.5 (ref 4.6–8)
PROTEIN,URINE, POC: NEGATIVE
SPECIFIC GRAVITY, URINE, POC: 1.03 (ref 1–1.03)
URINALYSIS CLARITY, POC: NORMAL
URINALYSIS COLOR, POC: NORMAL
UROBILINOGEN, POC: NORMAL

## 2022-09-08 PROCEDURE — 99213 OFFICE O/P EST LOW 20 MIN: CPT | Performed by: UROLOGY

## 2022-09-08 PROCEDURE — 81003 URINALYSIS AUTO W/O SCOPE: CPT | Performed by: UROLOGY

## 2022-09-08 RX ORDER — SILODOSIN 4 MG/1
CAPSULE ORAL
Qty: 90 CAPSULE | Refills: 3 | Status: SHIPPED | OUTPATIENT
Start: 2022-09-08

## 2022-09-08 RX ORDER — FINASTERIDE 5 MG/1
TABLET, FILM COATED ORAL
Qty: 90 TABLET | Refills: 3 | Status: SHIPPED | OUTPATIENT
Start: 2022-09-08

## 2022-09-08 ASSESSMENT — ENCOUNTER SYMPTOMS: NAUSEA: 0

## 2022-09-08 NOTE — PROGRESS NOTES
Sexual activity: Not on file   Other Topics Concern    Not on file   Social History Narrative    Not on file     Social Determinants of Health     Financial Resource Strain: Not on file   Food Insecurity: Not on file   Transportation Needs: Not on file   Physical Activity: Not on file   Stress: Not on file   Social Connections: Not on file   Intimate Partner Violence: Not on file   Housing Stability: Not on file     Family History   Problem Relation Age of Onset    No Known Problems Brother     Pulmonary Fibrosis Mother     No Known Problems Sister     Heart Disease Father         CABG, Aortic valve replacement    Lung Disease Mother        Review of Systems  Constitutional:   Negative for fever. GI:  Negative for nausea. Genitourinary:  Negative for flank pain. Urinalysis  UA - Dipstick  Results for orders placed or performed in visit on 10/08/20   AMB POC URINALYSIS DIP STICK AUTO W/O MICRO   Result Value Ref Range    Color (UA POC) Yellow     Clarity (UA POC) Clear     Glucose, Urine, POC Negative Negative    Bilirubin, Urine, POC Negative Negative    Ketones, Urine, POC Negative Negative    Specific Gravity, Urine, POC 1.030 1.001 - 1.035 NA    Blood (UA POC) Negative Negative    pH, Urine, POC 5.5 4.6 - 8.0 NA    Protein, Urine, POC Negative Negative    Urobilinogen, POC 0.2 mg/dL 0.2 - 1    Nitrite, Urine, POC Negative Negative    Leukocyte Esterase, Urine, POC Negative Negative       There were no vitals taken for this visit. GENERAL: NAD, ALERT, A&O x 3, GAIT NORMAL  LUNGS: easy work of breathing  ABDOMEN: soft, non tender  MANUELA: 2+ no nodule  NEUROLOGIC: cranial nerves 2-12 grossly intact           Assessment and Plan    ICD-10-CM    1. Benign prostatic hyperplasia with urinary obstruction  N40.1 AMB POC URINALYSIS DIP STICK AUTO W/O MICRO    N13.8 finasteride (PROSCAR) 5 MG tablet     silodosin (RAPAFLO) 4 MG CAPS capsule     PSA, Diagnostic          He will continue silodosin/finasteride.   Pt. will follow up in 6 months with psa. If doing ok, we will then switch to yearly visits. I have spent 20 minutes today reviewing previous notes, test results and face to face with the patient as well as documenting.

## 2022-10-13 ENCOUNTER — OFFICE VISIT (OUTPATIENT)
Dept: FAMILY MEDICINE CLINIC | Facility: CLINIC | Age: 62
End: 2022-10-13
Payer: COMMERCIAL

## 2022-10-13 VITALS
WEIGHT: 195.4 LBS | HEART RATE: 55 BPM | BODY MASS INDEX: 31.4 KG/M2 | OXYGEN SATURATION: 97 % | HEIGHT: 66 IN | TEMPERATURE: 97.9 F | SYSTOLIC BLOOD PRESSURE: 130 MMHG | DIASTOLIC BLOOD PRESSURE: 70 MMHG

## 2022-10-13 DIAGNOSIS — N13.8 BENIGN PROSTATIC HYPERPLASIA WITH URINARY OBSTRUCTION: ICD-10-CM

## 2022-10-13 DIAGNOSIS — R00.1 BRADYCARDIA ON ECG: ICD-10-CM

## 2022-10-13 DIAGNOSIS — E55.9 VITAMIN D DEFICIENCY: Primary | ICD-10-CM

## 2022-10-13 DIAGNOSIS — M79.641 RIGHT HAND PAIN: ICD-10-CM

## 2022-10-13 DIAGNOSIS — R53.83 OTHER FATIGUE: ICD-10-CM

## 2022-10-13 DIAGNOSIS — N40.1 BENIGN PROSTATIC HYPERPLASIA WITH URINARY OBSTRUCTION: ICD-10-CM

## 2022-10-13 DIAGNOSIS — E78.2 MIXED HYPERLIPIDEMIA: ICD-10-CM

## 2022-10-13 DIAGNOSIS — D69.6 THROMBOCYTOPENIA (HCC): ICD-10-CM

## 2022-10-13 LAB
BASOPHILS # BLD: 0 K/UL (ref 0–0.2)
BASOPHILS NFR BLD: 1 % (ref 0–2)
DIFFERENTIAL METHOD BLD: ABNORMAL
EOSINOPHIL # BLD: 0.1 K/UL (ref 0–0.8)
EOSINOPHIL NFR BLD: 1 % (ref 0.5–7.8)
ERYTHROCYTE [DISTWIDTH] IN BLOOD BY AUTOMATED COUNT: 12.5 % (ref 11.9–14.6)
HCT VFR BLD AUTO: 49.1 % (ref 41.1–50.3)
HGB BLD-MCNC: 16.1 G/DL (ref 13.6–17.2)
IMM GRANULOCYTES # BLD AUTO: 0 K/UL (ref 0–0.5)
IMM GRANULOCYTES NFR BLD AUTO: 0 % (ref 0–5)
LYMPHOCYTES # BLD: 1.4 K/UL (ref 0.5–4.6)
LYMPHOCYTES NFR BLD: 26 % (ref 13–44)
MCH RBC QN AUTO: 29.6 PG (ref 26.1–32.9)
MCHC RBC AUTO-ENTMCNC: 32.8 G/DL (ref 31.4–35)
MCV RBC AUTO: 90.3 FL (ref 82–102)
MONOCYTES # BLD: 0.5 K/UL (ref 0.1–1.3)
MONOCYTES NFR BLD: 9 % (ref 4–12)
NEUTS SEG # BLD: 3.4 K/UL (ref 1.7–8.2)
NEUTS SEG NFR BLD: 63 % (ref 43–78)
NRBC # BLD: 0 K/UL (ref 0–0.2)
PLATELET # BLD AUTO: 137 K/UL (ref 150–450)
PMV BLD AUTO: 12.5 FL (ref 9.4–12.3)
RBC # BLD AUTO: 5.44 M/UL (ref 4.23–5.6)
WBC # BLD AUTO: 5.4 K/UL (ref 4.3–11.1)

## 2022-10-13 PROCEDURE — 99214 OFFICE O/P EST MOD 30 MIN: CPT | Performed by: FAMILY MEDICINE

## 2022-10-13 RX ORDER — MELOXICAM 7.5 MG/1
7.5 TABLET ORAL DAILY
Qty: 30 TABLET | Refills: 3 | Status: CANCELLED | OUTPATIENT
Start: 2022-10-13

## 2022-10-13 ASSESSMENT — PATIENT HEALTH QUESTIONNAIRE - PHQ9
SUM OF ALL RESPONSES TO PHQ9 QUESTIONS 1 & 2: 0
1. LITTLE INTEREST OR PLEASURE IN DOING THINGS: 0
SUM OF ALL RESPONSES TO PHQ QUESTIONS 1-9: 0
2. FEELING DOWN, DEPRESSED OR HOPELESS: 0
SUM OF ALL RESPONSES TO PHQ QUESTIONS 1-9: 0

## 2022-10-14 LAB
25(OH)D3 SERPL-MCNC: 41.9 NG/ML (ref 30–100)
ALBUMIN SERPL-MCNC: 3.8 G/DL (ref 3.2–4.6)
ALBUMIN/GLOB SERPL: 1.5 {RATIO} (ref 0.4–1.6)
ALP SERPL-CCNC: 118 U/L (ref 50–136)
ALT SERPL-CCNC: 64 U/L (ref 12–65)
ANION GAP SERPL CALC-SCNC: 3 MMOL/L (ref 2–11)
AST SERPL-CCNC: 24 U/L (ref 15–37)
BILIRUB SERPL-MCNC: 1.4 MG/DL (ref 0.2–1.1)
BUN SERPL-MCNC: 19 MG/DL (ref 8–23)
CALCIUM SERPL-MCNC: 9 MG/DL (ref 8.3–10.4)
CHLORIDE SERPL-SCNC: 113 MMOL/L (ref 101–110)
CHOLEST SERPL-MCNC: 171 MG/DL
CO2 SERPL-SCNC: 24 MMOL/L (ref 21–32)
CREAT SERPL-MCNC: 1 MG/DL (ref 0.8–1.5)
GLOBULIN SER CALC-MCNC: 2.5 G/DL (ref 2.8–4.5)
GLUCOSE SERPL-MCNC: 86 MG/DL (ref 65–100)
HDLC SERPL-MCNC: 67 MG/DL (ref 40–60)
HDLC SERPL: 2.6 {RATIO}
LDLC SERPL CALC-MCNC: 84 MG/DL
POTASSIUM SERPL-SCNC: 4.4 MMOL/L (ref 3.5–5.1)
PROT SERPL-MCNC: 6.3 G/DL (ref 6.3–8.2)
PSA SERPL-MCNC: 2.3 NG/ML
SODIUM SERPL-SCNC: 140 MMOL/L (ref 133–143)
TRIGL SERPL-MCNC: 100 MG/DL (ref 35–150)
VLDLC SERPL CALC-MCNC: 20 MG/DL (ref 6–23)

## 2022-10-15 ASSESSMENT — ENCOUNTER SYMPTOMS
SORE THROAT: 0
COLOR CHANGE: 0
PHOTOPHOBIA: 0
NAUSEA: 0
SHORTNESS OF BREATH: 0
EYE PAIN: 0
COUGH: 0
ABDOMINAL PAIN: 0
BLOOD IN STOOL: 0
ABDOMINAL DISTENTION: 0

## 2022-10-15 NOTE — ASSESSMENT & PLAN NOTE
Monitored by specialist- no acute findings meriting change in the plan---SEEN BY PULMONARY FOR DYSPNEA ON EXERTION -----WILL HAVE COMPLETE PULMONARY FUNCTIONS AND IF NO PROBLEMS HIGH RESOLUTION CT.

## 2022-10-15 NOTE — PROGRESS NOTES
Cari Meckel III  1960 is a 57 y.o. male ,Established patient, here for evaluation of the following chief complaint(s):   Chief Complaint   Patient presents with    3 Month Follow-Up     Pt is not fasting, would like to go over results from x ray of hands. Medication Refill          1. Vitamin D deficiency  -     Cholecalciferol (VITAMIN D) 10 MCG (400 UNIT) CAPS Capsule; Take 1 capsule by mouth daily Take 1 capsule once daily, Disp-30 capsule, R-3Normal  -     Vitamin D 25 Hydroxy; Future  2. Right hand pain  -     [address] [place] [address], [facility]   3. Mixed hyperlipidemia  -     Lipid Panel; Future  4. Other fatigue  Assessment & Plan:   Monitored by specialist- no acute findings meriting change in the plan---SEEN BY PULMONARY FOR DYSPNEA ON EXERTION -----WILL HAVE COMPLETE PULMONARY FUNCTIONS AND IF NO PROBLEMS HIGH RESOLUTION CT. Orders:  -     CBC with Auto Differential; Future  -     Comprehensive Metabolic Panel; Future  5. Thrombocytopenia (HCC)  -     CBC with Auto Differential; Future  6. Benign prostatic hyperplasia with urinary obstruction  7. Bradycardia on ECG  Assessment & Plan:   Monitored by specialist- no acute findings meriting change in the plan        Return in about 3 months (around 1/13/2023). Subjective   SUBJECTIVE/OBJECTIVE:  Medication Refill  This is a chronic problem. The current episode started more than 1 year ago. The problem occurs daily. The problem has been unchanged. Pertinent negatives include no abdominal pain, chest pain, chills, coughing, fever, headaches, joint swelling, myalgias, nausea, rash, sore throat or weakness. Hand Pain   The incident occurred more than 1 week ago. There was no injury mechanism. The quality of the pain is described as aching. Pertinent negatives include no chest pain. Review of Systems   Constitutional:  Negative for chills and fever.    HENT:  Negative for ear pain, hearing loss and sore throat. Eyes:  Negative for photophobia and pain. Respiratory:  Negative for cough and shortness of breath. Cardiovascular:  Negative for chest pain, palpitations and leg swelling. Gastrointestinal:  Negative for abdominal distention, abdominal pain, blood in stool and nausea. Genitourinary:  Negative for dysuria and urgency. Musculoskeletal:  Negative for joint swelling and myalgias. Skin:  Negative for color change, pallor and rash. Neurological:  Negative for speech difficulty, weakness, light-headedness and headaches. Hematological:  Negative for adenopathy. Psychiatric/Behavioral:  Negative for agitation, confusion, hallucinations, self-injury and suicidal ideas. Objective   Physical Exam  Constitutional:       Appearance: Normal appearance. HENT:      Head: Normocephalic and atraumatic. Nose: Nose normal.   Eyes:      Extraocular Movements: Extraocular movements intact. Conjunctiva/sclera: Conjunctivae normal.      Pupils: Pupils are equal, round, and reactive to light. Cardiovascular:      Rate and Rhythm: Normal rate and regular rhythm. Pulses: Normal pulses. Heart sounds: Normal heart sounds. Pulmonary:      Effort: Pulmonary effort is normal.      Breath sounds: Normal breath sounds. Abdominal:      General: Abdomen is flat. Bowel sounds are normal.      Palpations: Abdomen is soft. Skin:     General: Skin is warm and dry. Neurological:      General: No focal deficit present. Mental Status: He is alert and oriented to person, place, and time. Psychiatric:         Mood and Affect: Mood normal.                 An electronic signature was used to authenticate this note.     --Toni Styles MD

## 2022-10-18 ENCOUNTER — OFFICE VISIT (OUTPATIENT)
Dept: ORTHOPEDIC SURGERY | Age: 62
End: 2022-10-18
Payer: COMMERCIAL

## 2022-10-18 VITALS — HEIGHT: 66 IN | WEIGHT: 195 LBS | BODY MASS INDEX: 31.34 KG/M2

## 2022-10-18 DIAGNOSIS — M19.049 HAND ARTHRITIS: Primary | ICD-10-CM

## 2022-10-18 PROCEDURE — 99204 OFFICE O/P NEW MOD 45 MIN: CPT | Performed by: ORTHOPAEDIC SURGERY

## 2022-10-18 NOTE — PROGRESS NOTES
Orthopaedic Hand Clinic Note    Name: Frida Abad  YOB: 1960  Gender: male  MRN: [id number]      CC: Patient referred for evaluation of upper extremity pain    HPI: Frida Abad is a 60 y.o. male with a chief complaint of pain over the dorsal aspect of the left hand, surrounding the second metacarpophalangeal joint. He had no history of injury or overuse that he thinks could have provoked this. He denies any numbness or tingling at the time. He said the right middle finger was somewhat painful at that time as well. He did have x-rays obtained when it was hurting back in July, but the pain is resolved since then. .      ROS/Meds/PSH/PMH/FH/SH: I personally reviewed the patients standard intake form. Pertinents are discussed in the HPI    Physical Examination:    Musculoskeletal Exam:  Examination on the bilateral upper extremity demonstrates cap refill < 5 seconds in all fingers, skin is intact. There is no swelling,. There is no tenderness to palpation throughout the hand or digits. No pain with left index or right middle range of motion. No triggering present. Light touch sensation is intact throughout. He is able to fully flex and extend all digits. .    Imaging / Electrodiagnostic Tests:     I independently reviewed and interpreted radiographs of bilateral hands from July 14, 2022. They demonstrate mild degenerative changes, with joint space narrowing osteophyte formation at bilateral second and third metacarpophalangeal joints. There are also mild degenerative changes at bilateral thumb CMC joints. Assessment:     ICD-10-CM    1. Hand arthritis  M19.049           Plan:   We discussed the diagnosis and different treatment options. We discussed observation, therapy, antiinflammatory medications and other pertinent treatment modalities. After discussing in detail the patient elects to proceed with NSAIDs as needed for pain.  I have explained that pain from arthritis is a chronic problem which can flare up intermittently, and can be activity related. He will follow up as needed. Patient voiced accordance and understanding of the information provided and the formulated plan. All questions were answered to the patient's satisfaction during the encounter.       Greyson Clement MD  Orthopaedic Surgery  10/18/22  10:09 AM

## 2023-01-20 DIAGNOSIS — N40.1 BENIGN PROSTATIC HYPERPLASIA WITH URINARY OBSTRUCTION: ICD-10-CM

## 2023-01-20 DIAGNOSIS — N13.8 BENIGN PROSTATIC HYPERPLASIA WITH URINARY OBSTRUCTION: ICD-10-CM

## 2023-02-02 RX ORDER — SILODOSIN 4 MG/1
CAPSULE ORAL
Qty: 90 CAPSULE | Refills: 3 | OUTPATIENT
Start: 2023-02-02

## 2023-02-02 RX ORDER — FINASTERIDE 5 MG/1
TABLET, FILM COATED ORAL
Qty: 90 TABLET | Refills: 3 | OUTPATIENT
Start: 2023-02-02

## 2023-02-07 ENCOUNTER — TELEPHONE (OUTPATIENT)
Dept: FAMILY MEDICINE CLINIC | Facility: CLINIC | Age: 63
End: 2023-02-07

## 2023-02-07 NOTE — TELEPHONE ENCOUNTER
Will need appointment to have paperwork we have gotten through fax, to be filled out. I see he has one on 02/20/23 can we just check make sure he doesn't need one before then, could do a VV if needed.  Thank you

## 2023-02-20 ENCOUNTER — NURSE ONLY (OUTPATIENT)
Dept: PULMONOLOGY | Age: 63
End: 2023-02-20
Payer: COMMERCIAL

## 2023-02-20 DIAGNOSIS — R06.02 SOB (SHORTNESS OF BREATH): Primary | ICD-10-CM

## 2023-02-20 LAB
FEV 1 , POC: 1.32 L
FEV1 % PRED, POC: 41 %
FEV1/FVC, POC: NORMAL
FVC % PRED, POC: 47 %
FVC, POC: NORMAL

## 2023-02-20 PROCEDURE — 94729 DIFFUSING CAPACITY: CPT | Performed by: INTERNAL MEDICINE

## 2023-02-20 PROCEDURE — 94726 PLETHYSMOGRAPHY LUNG VOLUMES: CPT | Performed by: INTERNAL MEDICINE

## 2023-02-20 PROCEDURE — 94060 EVALUATION OF WHEEZING: CPT | Performed by: INTERNAL MEDICINE

## 2023-02-20 ASSESSMENT — PULMONARY FUNCTION TESTS
FVC_PERCENT_PREDICTED_POC: 47
FEV1_PERCENT_PREDICTED_POC: 41

## 2023-03-06 ENCOUNTER — OFFICE VISIT (OUTPATIENT)
Dept: PULMONOLOGY | Age: 63
End: 2023-03-06
Payer: COMMERCIAL

## 2023-03-06 VITALS
SYSTOLIC BLOOD PRESSURE: 140 MMHG | HEART RATE: 69 BPM | OXYGEN SATURATION: 96 % | WEIGHT: 194 LBS | RESPIRATION RATE: 20 BRPM | TEMPERATURE: 98 F | HEIGHT: 66 IN | DIASTOLIC BLOOD PRESSURE: 80 MMHG | BODY MASS INDEX: 31.18 KG/M2

## 2023-03-06 DIAGNOSIS — J44.9 OBSTRUCTIVE LUNG DISEASE (GENERALIZED) (HCC): ICD-10-CM

## 2023-03-06 DIAGNOSIS — R06.02 SOB (SHORTNESS OF BREATH): ICD-10-CM

## 2023-03-06 DIAGNOSIS — R06.02 SOB (SHORTNESS OF BREATH): Primary | ICD-10-CM

## 2023-03-06 LAB — EOSINOPHIL # BLD: 0.07 K/UL

## 2023-03-06 PROCEDURE — 99214 OFFICE O/P EST MOD 30 MIN: CPT | Performed by: INTERNAL MEDICINE

## 2023-03-06 RX ORDER — ALBUTEROL SULFATE 90 UG/1
2 AEROSOL, METERED RESPIRATORY (INHALATION) EVERY 6 HOURS PRN
Qty: 1 EACH | Refills: 11 | Status: SHIPPED | OUTPATIENT
Start: 2023-03-06

## 2023-03-06 RX ORDER — FLUTICASONE FUROATE AND VILANTEROL 200; 25 UG/1; UG/1
1 POWDER RESPIRATORY (INHALATION) DAILY
Qty: 1 EACH | Refills: 11 | Status: SHIPPED | OUTPATIENT
Start: 2023-03-06

## 2023-03-06 NOTE — PROGRESS NOTES
[facility]tt[facility] Primer Christiane Butt.  [facility], [address] [facility]  [phone]    Patient Name:  Kyle Montgomery  YOB: 1960      Office Visit 3/6/2023    CHIEF COMPLAINT:    Chief Complaint   Patient presents with    Shortness of Breath       HISTORY OF PRESENT ILLNESS:    Previously seen by:  Dr. Abisai Mederos on 22  This is a very pleasant 76-year-old white male with no past pulmonary history, never smoker who presents for evaluation of subjective sensation of dyspnea on exertion. He tells me that about 6 months ago he had noted worsening dyspnea on exertion on more than usual activity noticed this first while moving furniture with his father apparently this was indoors, he went to his primary doctor who upon further questioning referred him to us for further evaluation. The patient tells me that at times she has cough associated with this. He denies any wheezing, hemoptysis, unintentional weight loss in fact he had gained about 15 pounds in the past 1 to 2 years unintentionally. He does have a family history of pulmonary fibrosis where his mother suffered from this illness and  from it. As I mentioned he denies any chronic pulmonary problems, there is no known history of exposure to tuberculosis, asbestos, he has 2 dogs but no birds. He does not drink alcohol at all. Chest x-ray performed shows no active pulmonary disease, office spirometry reveals moderate restrictive defect  PLAN:  Encounter Diagnosis   Name Primary? SOB (shortness of breath) Yes   Nonspecific dyspnea on exertion, this may be related to deconditioning, age, recent weight  gain that is nonspecific and subjective. Objectively the patient has a normal chest x-ray and he does have however some restriction on pulmonary function testing. It is important to note that the patient has a strong family history of pulmonary fibrosis with his mother dying from IPF.   Clinically however on physical examination as well as on chest x-ray I do not see any evidence of pulmonary fibrosis. On clinical exam however early subtle changes may be missed and complete pulmonary function testing is in order to exclude this possibility. Therefore complete pulmonary function testing will be ordered with diffusion capacity to assess for his residual volume as well as diffusion capacity. If these are abnormal and suggest interstitial lung disease or early interstitial lung disease then a high-resolution CT scan with prone, supine, inspiratory and expiratory imaging will be performed to further assess his pulmonary parenchyma in the meantime the patient will continue to maintain as much physical activity as he can understanding that he is older and that that will put some limitation to his physical capacity it seems however that he is able to perform more than usual activity without any difficulty except for the subjective sensation of dyspnea. He will return to the office for follow-up in 3 months to discuss the results of his complete pulmonary function testing and whether we need to pursue high-resolution CT scanning of the chest or not. Any questions asked were answered seemingly to his satisfaction,      Return for follow-up in 3 months,  Complete pulmonary function testing with diffusion capacity,      March 6, 2023:    Patient returns for follow-up, he continues to have exertional dyspnea and intermittent dyspnea. Complete pulmonary function performed as scheduled revealed surprisingly that the patient has an obstructive defect that is partially reversible with bronchodilator although it does not meet the ACCP criteria for reversibility of airways with bronchodilator administration. As I mentioned previously he is a lifelong non-smoker. He denies history of asthma as mentioned before or noxious smoke inhalation or work in smoky environments throughout his life.     Past Medical History:   Diagnosis Date Bilateral inguinal hernia     BMI 29.0-29.9,adult     BMI 29.1         Patient Active Problem List   Diagnosis    Bradycardia    Family history of ischemic heart disease (IHD)    Encounter for screening for malignant neoplasm of colon    Bradycardia on ECG    Other fatigue    EKG abnormalities    Coronary artery disease involving native coronary artery of native heart without angina pectoris           Past Surgical History:   Procedure Laterality Date    APPENDECTOMY      HERNIA REPAIR  2016    [facility]    ORTHOPEDIC SURGERY Right     RIGHT FOOT    ORTHOPEDIC SURGERY Bilateral     Thumbs    ORTHOPEDIC SURGERY      Both hands, Right foot    [place] APPENDECTOMY           Social History     Socioeconomic History    Marital status:      Spouse name: Not on file    Number of children: Not on file    Years of education: Not on file    Highest education level: Not on file   Occupational History    Not on file   Tobacco Use    Smoking status: Never    Smokeless tobacco: Never   Vaping Use    Vaping Use: Never used   Substance and Sexual Activity    Alcohol use: No    Drug use: Never    Sexual activity: Not on file   Other Topics Concern    Not on file   Social History Narrative    Not on file     Social Determinants of Health     Financial Resource Strain: Not on file   Food Insecurity: Not on file   Transportation Needs: Not on file   Physical Activity: Not on file   Stress: Not on file   Social Connections: Not on file   Intimate Partner Violence: Not on file   Housing Stability: Not on file         Family History   Problem Relation Age of Onset    No Known Problems Brother     Pulmonary Fibrosis Mother     No Known Problems Sister     Heart Disease Father         CABG, Aortic valve replacement    Lung Disease Mother          No Known Allergies      Current Outpatient Medications   Medication Sig    Cholecalciferol (VITAMIN D) 10 MCG (400 UNIT) CAPS Capsule Take 1 capsule by mouth daily Take 1 capsule once daily finasteride (PROSCAR) 5 MG tablet TAKE 1 TABLET BY MOUTH DAILY    silodosin (RAPAFLO) 4 MG CAPS capsule TAKE 1 CAPSULE BY MOUTH DAILY WITH BREAKFAST    meloxicam (MOBIC) 7.5 MG tablet Take 7.5 mg by mouth daily     No current facility-administered medications for this visit. Review of Systems          PHYSICAL EXAM:    Vitals:    03/06/23 0914   BP: (!) 140/80   Pulse: 69   Resp: 20   Temp: 98 °F (36.7 °C)   SpO2: 96%        GENERAL APPEARANCE:   The patient is tightly over weight android type obesity and in no respiratory distress. HEENT:   PERRL. Conjunctivae unremarkable. Nasal mucosa is without epistaxis, exudate, or polyps. Gums and dentition are unremarkable. There is no oropharyngeal narrowing. TMs are clear. NECK/LYMPHATIC:   Symmetrical with no elevation of jugular venous pulsation. Trachea midline. No thyroid enlargement. No cervical adenopathy. LUNGS:   Normal respiratory effort with symmetrical lung expansion. Breath sounds clear to auscultation bilaterally with no rhonchi wheezing or crackles. HEART:   There is a regular rate and rhythm. No murmur, rub, or gallop. There is no edema in the lower extremities. ABDOMEN:   Soft and non-tender. No hepatosplenomegaly. Bowel sounds are normal.     SKIN:   There are no rashes, cyanosis, jaundice, or ecchymosis present. EXTREMITIES:   The extremities are unremarkable without clubbing, cyanosis, joint inflammation, degenerative, or ischemic change. MUSCULOSKELETAL:   There is no abnormal tone, muscle atrophy, or abnormal movement present. NEURO:   The patient is alert and oriented to person, place, and time. Memory appears intact and mood is normal.  No gross sensorimotor deficits are present.       DIAGNOSTIC TESTS:      CXR:           Spirometry:  08/26/2022      Moderate to severe obstruction with very mild gas trapping and decreased diffusion capacity          FeNO testing March 6, 2023:  Fractional exhaled nitric oxide testing was performed with level of 26 ppb. This is indicative of intermediate likelihood of eosinophilic inflammation/asthma. Arsalan Mejia MD.         Exercise oximetry on room air on August 26, 2022. Baseline oxygen saturation 99%, baseline heart rate 73 bpm, at peak exercise the lowest oxygen saturation noted was 96%, the highest heart rate noted was 98 bpm.    Impression: This is a normal exercise oximetry with no evidence of exertional desaturation on room air    Corby Marcum MD.    ASSESSMENT:  (Medical Decision Making)       Patient Active Problem List   Diagnosis    Bradycardia    Family history of ischemic heart disease (IHD)    Encounter for screening for malignant neoplasm of colon    Bradycardia on ECG    Other fatigue    EKG abnormalities    Coronary artery disease involving native coronary artery of native heart without angina pectoris           PLAN:  Encounter Diagnoses   Name Primary? SOB (shortness of breath) Yes    Obstructive lung disease (generalized) ([address]      This is a curious case, unexpectedly, on pulmonary function testing we identified obstruction moreover there was a borderline bronchodilator response. Fractional expression of nitric oxide therefore performed today reveals a 26 ppb value which is intermediate. Taking that into consideration I suspect since the patient is a lifelong non-smoker that we are dealing with unrecognized untreated longstanding asthma with fixed airway obstruction as as of now there is no other explanation. I will therefore start empiric therapy for this, Breo 200 once daily will be implemented along with intermittent albuterol therapy, the patient will be sent to the lab for IgE and absolute eosinophil count and return to the office for follow-up in 3 months time to discuss whether he has subjective response to therapy.   Any questions asked were answered seemingly to his satisfaction, total time spent 30 minutes    Return for follow-up in 3 months, and discussed results of empiric therapy as well as blood test today    Orders Placed This Encounter   Procedures    NITRIC OXIDE  GAS DETERMINATION    IgE     Standing Status:   Future     Standing Expiration Date:   3/6/2024    Eosinophil, Absolute       No orders of the defined types were placed in this encounter. Adenike Norris MD    Dictated using voice recognition software.  Proofread, but unrecognized voice recognition errors may exist.

## 2023-03-09 LAB — IGE SERPL-ACNC: 107 IU/ML (ref 6–495)

## 2023-03-24 DIAGNOSIS — N13.8 BENIGN PROSTATIC HYPERPLASIA WITH URINARY OBSTRUCTION: Primary | ICD-10-CM

## 2023-03-24 DIAGNOSIS — N40.1 BENIGN PROSTATIC HYPERPLASIA WITH URINARY OBSTRUCTION: Primary | ICD-10-CM

## 2023-03-27 ENCOUNTER — OFFICE VISIT (OUTPATIENT)
Dept: FAMILY MEDICINE CLINIC | Facility: CLINIC | Age: 63
End: 2023-03-27
Payer: COMMERCIAL

## 2023-03-27 VITALS
SYSTOLIC BLOOD PRESSURE: 130 MMHG | HEIGHT: 66 IN | OXYGEN SATURATION: 95 % | TEMPERATURE: 97.6 F | BODY MASS INDEX: 31.34 KG/M2 | WEIGHT: 195 LBS | HEART RATE: 60 BPM | DIASTOLIC BLOOD PRESSURE: 70 MMHG

## 2023-03-27 DIAGNOSIS — R06.09 DOE (DYSPNEA ON EXERTION): ICD-10-CM

## 2023-03-27 DIAGNOSIS — R53.81 CHRONIC FATIGUE AND MALAISE: Primary | ICD-10-CM

## 2023-03-27 DIAGNOSIS — M79.644 PAIN OF FINGER OF RIGHT HAND: ICD-10-CM

## 2023-03-27 DIAGNOSIS — R53.82 CHRONIC FATIGUE AND MALAISE: Primary | ICD-10-CM

## 2023-03-27 LAB
ALBUMIN SERPL-MCNC: 3.7 G/DL (ref 3.2–4.6)
ALBUMIN/GLOB SERPL: 1.4 (ref 0.4–1.6)
ALP SERPL-CCNC: 153 U/L (ref 50–136)
ALT SERPL-CCNC: 51 U/L (ref 12–65)
ANION GAP SERPL CALC-SCNC: 3 MMOL/L (ref 2–11)
AST SERPL-CCNC: 26 U/L (ref 15–37)
BASOPHILS # BLD: 0 K/UL (ref 0–0.2)
BASOPHILS NFR BLD: 1 % (ref 0–2)
BILIRUB SERPL-MCNC: 1.3 MG/DL (ref 0.2–1.1)
BUN SERPL-MCNC: 18 MG/DL (ref 8–23)
CALCIUM SERPL-MCNC: 9.2 MG/DL (ref 8.3–10.4)
CHLORIDE SERPL-SCNC: 112 MMOL/L (ref 101–110)
CO2 SERPL-SCNC: 26 MMOL/L (ref 21–32)
CREAT SERPL-MCNC: 1 MG/DL (ref 0.8–1.5)
DIFFERENTIAL METHOD BLD: ABNORMAL
EOSINOPHIL # BLD: 0.1 K/UL (ref 0–0.8)
EOSINOPHIL NFR BLD: 1 % (ref 0.5–7.8)
ERYTHROCYTE [DISTWIDTH] IN BLOOD BY AUTOMATED COUNT: 12.7 % (ref 11.9–14.6)
GLOBULIN SER CALC-MCNC: 2.6 G/DL (ref 2.8–4.5)
GLUCOSE SERPL-MCNC: 155 MG/DL (ref 65–100)
HCT VFR BLD AUTO: 48.3 % (ref 41.1–50.3)
HGB BLD-MCNC: 16 G/DL (ref 13.6–17.2)
IMM GRANULOCYTES # BLD AUTO: 0 K/UL (ref 0–0.5)
IMM GRANULOCYTES NFR BLD AUTO: 1 % (ref 0–5)
LYMPHOCYTES # BLD: 1.4 K/UL (ref 0.5–4.6)
LYMPHOCYTES NFR BLD: 23 % (ref 13–44)
MCH RBC QN AUTO: 30.1 PG (ref 26.1–32.9)
MCHC RBC AUTO-ENTMCNC: 33.1 G/DL (ref 31.4–35)
MCV RBC AUTO: 91 FL (ref 82–102)
MONOCYTES # BLD: 0.4 K/UL (ref 0.1–1.3)
MONOCYTES NFR BLD: 8 % (ref 4–12)
NEUTS SEG # BLD: 3.9 K/UL (ref 1.7–8.2)
NEUTS SEG NFR BLD: 66 % (ref 43–78)
NRBC # BLD: 0 K/UL (ref 0–0.2)
PLATELET # BLD AUTO: 129 K/UL (ref 150–450)
PMV BLD AUTO: 12.2 FL (ref 9.4–12.3)
POTASSIUM SERPL-SCNC: 4.2 MMOL/L (ref 3.5–5.1)
PROT SERPL-MCNC: 6.3 G/DL (ref 6.3–8.2)
RBC # BLD AUTO: 5.31 M/UL (ref 4.23–5.6)
SODIUM SERPL-SCNC: 141 MMOL/L (ref 133–143)
VIT B12 SERPL-MCNC: 352 PG/ML (ref 193–986)
WBC # BLD AUTO: 5.8 K/UL (ref 4.3–11.1)

## 2023-03-27 PROCEDURE — 99213 OFFICE O/P EST LOW 20 MIN: CPT | Performed by: FAMILY MEDICINE

## 2023-03-27 SDOH — ECONOMIC STABILITY: INCOME INSECURITY: HOW HARD IS IT FOR YOU TO PAY FOR THE VERY BASICS LIKE FOOD, HOUSING, MEDICAL CARE, AND HEATING?: NOT HARD AT ALL

## 2023-03-27 SDOH — ECONOMIC STABILITY: FOOD INSECURITY: WITHIN THE PAST 12 MONTHS, YOU WORRIED THAT YOUR FOOD WOULD RUN OUT BEFORE YOU GOT MONEY TO BUY MORE.: NEVER TRUE

## 2023-03-27 SDOH — ECONOMIC STABILITY: FOOD INSECURITY: WITHIN THE PAST 12 MONTHS, THE FOOD YOU BOUGHT JUST DIDN'T LAST AND YOU DIDN'T HAVE MONEY TO GET MORE.: NEVER TRUE

## 2023-03-27 SDOH — ECONOMIC STABILITY: HOUSING INSECURITY
IN THE LAST 12 MONTHS, WAS THERE A TIME WHEN YOU DID NOT HAVE A STEADY PLACE TO SLEEP OR SLEPT IN A SHELTER (INCLUDING NOW)?: NO

## 2023-03-27 ASSESSMENT — ENCOUNTER SYMPTOMS
COUGH: 0
NAUSEA: 0
EYE PAIN: 0
ABDOMINAL PAIN: 0
SHORTNESS OF BREATH: 1
BLOOD IN STOOL: 0
SORE THROAT: 0
ABDOMINAL DISTENTION: 0
COLOR CHANGE: 0
PHOTOPHOBIA: 0

## 2023-03-27 ASSESSMENT — PATIENT HEALTH QUESTIONNAIRE - PHQ9
SUM OF ALL RESPONSES TO PHQ QUESTIONS 1-9: 0
2. FEELING DOWN, DEPRESSED OR HOPELESS: 0
1. LITTLE INTEREST OR PLEASURE IN DOING THINGS: 0
SUM OF ALL RESPONSES TO PHQ QUESTIONS 1-9: 0
SUM OF ALL RESPONSES TO PHQ9 QUESTIONS 1 & 2: 0

## 2023-03-28 NOTE — PROGRESS NOTES
Skye Bear III  1960 is a 60 y.o. male ,Established patient, here for evaluation of the following chief complaint(s):   Chief Complaint   Patient presents with    Follow-up     Right pointer finger was picking up a table and twisted this finger and is having pain and some swelling in it now. 1. Chronic fatigue and malaise  -     CBC with Auto Differential; Future  -     Comprehensive Metabolic Panel; Future  -     Vitamin B12; Future  2. VELASQUEZ (dyspnea on exertion)--he has been followed by pulmonary and may have MODERATE RESTRICTIVE DEFECT---BORDERLINE ASTHMA---fam history of IPF---ON BREOELLIPTA. -     CBC with Auto Differential; Future  -     Comprehensive Metabolic Panel; Future  -     Vitamin B12; Future  3. Pain of finger of right hand---ONLY SLIGHTLY INFLAMMED AND EASILY MOVEABLE---MAY SOAK IN EPSOM SALTS AND IF NO BETTER LET ME KNOW. Return in about 3 months (around 6/27/2023). Subjective   SUBJECTIVE/OBJECTIVE:  He twisted his finger on Friday --hurts a little but can move it ---he does not think it is broke. Shortness of Breath  This is a recurrent problem. The current episode started more than 1 year ago. The problem occurs every several days. The problem has been resolved. Pertinent negatives include no abdominal pain, chest pain, coryza, ear pain, fever, headaches, leg swelling, rash or sore throat. Review of Systems   Constitutional:  Negative for chills and fever. HENT:  Negative for ear pain, hearing loss and sore throat. Eyes:  Negative for photophobia and pain. Respiratory:  Positive for shortness of breath. Negative for cough. Cardiovascular:  Negative for chest pain, palpitations and leg swelling. Gastrointestinal:  Negative for abdominal distention, abdominal pain, blood in stool and nausea. Genitourinary:  Negative for dysuria and urgency. Musculoskeletal:  Negative for joint swelling and myalgias.    Skin:  Negative for color change,

## 2023-04-06 ENCOUNTER — OFFICE VISIT (OUTPATIENT)
Dept: UROLOGY | Age: 63
End: 2023-04-06
Payer: COMMERCIAL

## 2023-04-06 DIAGNOSIS — N13.8 BENIGN PROSTATIC HYPERPLASIA WITH URINARY OBSTRUCTION: Primary | ICD-10-CM

## 2023-04-06 DIAGNOSIS — N40.1 BENIGN PROSTATIC HYPERPLASIA WITH URINARY OBSTRUCTION: Primary | ICD-10-CM

## 2023-04-06 LAB
BILIRUBIN, URINE, POC: NEGATIVE
BLOOD URINE, POC: NEGATIVE
GLUCOSE URINE, POC: NEGATIVE
KETONES, URINE, POC: NEGATIVE
LEUKOCYTE ESTERASE, URINE, POC: NEGATIVE
NITRITE, URINE, POC: NEGATIVE
PH, URINE, POC: 5.5 (ref 4.6–8)
PROTEIN,URINE, POC: NEGATIVE
SPECIFIC GRAVITY, URINE, POC: 1.03 (ref 1–1.03)
URINALYSIS CLARITY, POC: NORMAL
URINALYSIS COLOR, POC: NORMAL
UROBILINOGEN, POC: NORMAL

## 2023-04-06 PROCEDURE — 99214 OFFICE O/P EST MOD 30 MIN: CPT | Performed by: UROLOGY

## 2023-04-06 PROCEDURE — 81003 URINALYSIS AUTO W/O SCOPE: CPT | Performed by: UROLOGY

## 2023-04-06 RX ORDER — FINASTERIDE 5 MG/1
TABLET, FILM COATED ORAL
Qty: 30 TABLET | Refills: 11 | Status: SHIPPED | OUTPATIENT
Start: 2023-04-06

## 2023-04-06 RX ORDER — SILODOSIN 4 MG/1
CAPSULE ORAL
Qty: 30 CAPSULE | Refills: 11 | Status: SHIPPED | OUTPATIENT
Start: 2023-04-06

## 2023-04-06 ASSESSMENT — ENCOUNTER SYMPTOMS: NAUSEA: 0

## 2023-04-06 NOTE — PROGRESS NOTES
Not on file    Number of children: Not on file    Years of education: Not on file    Highest education level: Not on file   Occupational History    Not on file   Tobacco Use    Smoking status: Never    Smokeless tobacco: Never   Vaping Use    Vaping Use: Never used   Substance and Sexual Activity    Alcohol use: No    Drug use: Never    Sexual activity: Not on file   Other Topics Concern    Not on file   Social History Narrative    Not on file     Social Determinants of Health     Financial Resource Strain: Low Risk     Difficulty of Paying Living Expenses: Not hard at all   Food Insecurity: No Food Insecurity    Worried About Running Out of Food in the Last Year: Never true    [address] in the Last Year: Never true   Transportation Needs: Unknown    Lack of Transportation (Medical): Not on file    Lack of Transportation (Non-Medical): No   Physical Activity: Not on file   Stress: Not on file   Social Connections: Not on file   Intimate Partner Violence: Not on file   Housing Stability: Unknown    Unable to Pay for Housing in the Last Year: Not on file    Number of Places Lived in the Last Year: Not on file    Unstable Housing in the Last Year: No     Family History   Problem Relation Age of Onset    No Known Problems Brother     Pulmonary Fibrosis Mother     No Known Problems Sister     Heart Disease Father         CABG, Aortic valve replacement    Lung Disease Mother        Review of Systems  Constitutional:   Negative for fever. GI:  Negative for nausea. Genitourinary:  Negative for flank pain.     Urinalysis  UA - Dipstick  Results for orders placed or performed in visit on 04/06/23   AMB POC URINALYSIS DIP STICK AUTO W/O MICRO   Result Value Ref Range    Color (UA POC)      Clarity (UA POC)      Glucose, Urine, POC Negative Negative    Bilirubin, Urine, POC Negative Negative    KETONES, Urine, POC Negative Negative    Specific Gravity, Urine, POC 1.030 1.001 - 1.035    Blood (UA POC) Negative Negative

## 2023-06-25 ASSESSMENT — PATIENT HEALTH QUESTIONNAIRE - PHQ9
SUM OF ALL RESPONSES TO PHQ QUESTIONS 1-9: 0
2. FEELING DOWN, DEPRESSED OR HOPELESS: 0
SUM OF ALL RESPONSES TO PHQ QUESTIONS 1-9: 0
SUM OF ALL RESPONSES TO PHQ QUESTIONS 1-9: 0
SUM OF ALL RESPONSES TO PHQ9 QUESTIONS 1 & 2: 0
1. LITTLE INTEREST OR PLEASURE IN DOING THINGS: 0
1. LITTLE INTEREST OR PLEASURE IN DOING THINGS: NOT AT ALL
SUM OF ALL RESPONSES TO PHQ9 QUESTIONS 1 & 2: 0
2. FEELING DOWN, DEPRESSED OR HOPELESS: NOT AT ALL
SUM OF ALL RESPONSES TO PHQ QUESTIONS 1-9: 0

## 2023-06-28 ENCOUNTER — OFFICE VISIT (OUTPATIENT)
Dept: FAMILY MEDICINE CLINIC | Facility: CLINIC | Age: 63
End: 2023-06-28
Payer: COMMERCIAL

## 2023-06-28 ENCOUNTER — HOSPITAL ENCOUNTER (OUTPATIENT)
Dept: GENERAL RADIOLOGY | Age: 63
Discharge: HOME | End: 2023-07-01
Payer: COMMERCIAL

## 2023-06-28 VITALS
OXYGEN SATURATION: 97 % | HEART RATE: 56 BPM | DIASTOLIC BLOOD PRESSURE: 64 MMHG | WEIGHT: 195 LBS | BODY MASS INDEX: 31.47 KG/M2 | TEMPERATURE: 97.6 F | SYSTOLIC BLOOD PRESSURE: 128 MMHG

## 2023-06-28 DIAGNOSIS — E78.2 MIXED HYPERLIPIDEMIA: ICD-10-CM

## 2023-06-28 DIAGNOSIS — R06.09 DOE (DYSPNEA ON EXERTION): ICD-10-CM

## 2023-06-28 DIAGNOSIS — N40.1 BENIGN PROSTATIC HYPERPLASIA WITH LOWER URINARY TRACT SYMPTOMS, SYMPTOM DETAILS UNSPECIFIED: Primary | ICD-10-CM

## 2023-06-28 DIAGNOSIS — M79.641 RIGHT HAND PAIN: ICD-10-CM

## 2023-06-28 DIAGNOSIS — R73.03 PREDIABETES: ICD-10-CM

## 2023-06-28 DIAGNOSIS — R73.09 ABNORMAL GLUCOSE: ICD-10-CM

## 2023-06-28 DIAGNOSIS — R68.89 ABNORMAL ANKLE BRACHIAL INDEX (ABI): ICD-10-CM

## 2023-06-28 DIAGNOSIS — R09.89 DECREASED PULSES IN FEET: ICD-10-CM

## 2023-06-28 DIAGNOSIS — M79.641 BILATERAL HAND PAIN: ICD-10-CM

## 2023-06-28 DIAGNOSIS — M79.642 BILATERAL HAND PAIN: ICD-10-CM

## 2023-06-28 LAB
BASOPHILS # BLD: 0 K/UL (ref 0–0.2)
BASOPHILS NFR BLD: 1 % (ref 0–2)
DIFFERENTIAL METHOD BLD: ABNORMAL
EOSINOPHIL # BLD: 0.1 K/UL (ref 0–0.8)
EOSINOPHIL NFR BLD: 1 % (ref 0.5–7.8)
ERYTHROCYTE [DISTWIDTH] IN BLOOD BY AUTOMATED COUNT: 13 % (ref 11.9–14.6)
HBA1C MFR BLD: 6.2 %
HCT VFR BLD AUTO: 49.4 % (ref 41.1–50.3)
HGB BLD-MCNC: 16.5 G/DL (ref 13.6–17.2)
IMM GRANULOCYTES # BLD AUTO: 0 K/UL (ref 0–0.5)
IMM GRANULOCYTES NFR BLD AUTO: 0 % (ref 0–5)
LYMPHOCYTES # BLD: 1.4 K/UL (ref 0.5–4.6)
LYMPHOCYTES NFR BLD: 23 % (ref 13–44)
MCH RBC QN AUTO: 30.3 PG (ref 26.1–32.9)
MCHC RBC AUTO-ENTMCNC: 33.4 G/DL (ref 31.4–35)
MCV RBC AUTO: 90.6 FL (ref 82–102)
MONOCYTES # BLD: 0.5 K/UL (ref 0.1–1.3)
MONOCYTES NFR BLD: 9 % (ref 4–12)
NEUTS SEG # BLD: 4 K/UL (ref 1.7–8.2)
NEUTS SEG NFR BLD: 66 % (ref 43–78)
NRBC # BLD: 0 K/UL (ref 0–0.2)
PLATELET # BLD AUTO: 138 K/UL (ref 150–450)
PMV BLD AUTO: 13 FL (ref 9.4–12.3)
RBC # BLD AUTO: 5.45 M/UL (ref 4.23–5.6)
WBC # BLD AUTO: 6 K/UL (ref 4.3–11.1)

## 2023-06-28 PROCEDURE — 83036 HEMOGLOBIN GLYCOSYLATED A1C: CPT | Performed by: FAMILY MEDICINE

## 2023-06-28 PROCEDURE — 73130 X-RAY EXAM OF HAND: CPT

## 2023-06-28 PROCEDURE — 99214 OFFICE O/P EST MOD 30 MIN: CPT | Performed by: FAMILY MEDICINE

## 2023-06-29 LAB
ALBUMIN SERPL-MCNC: 3.7 G/DL (ref 3.2–4.6)
ALBUMIN/GLOB SERPL: 1.2 (ref 0.4–1.6)
ALP SERPL-CCNC: 126 U/L (ref 50–136)
ALT SERPL-CCNC: 66 U/L (ref 12–65)
ANION GAP SERPL CALC-SCNC: 6 MMOL/L (ref 2–11)
AST SERPL-CCNC: 25 U/L (ref 15–37)
BILIRUB SERPL-MCNC: 1.2 MG/DL (ref 0.2–1.1)
BUN SERPL-MCNC: 21 MG/DL (ref 8–23)
CALCIUM SERPL-MCNC: 9.4 MG/DL (ref 8.3–10.4)
CHLORIDE SERPL-SCNC: 112 MMOL/L (ref 101–110)
CHOLEST SERPL-MCNC: 149 MG/DL
CO2 SERPL-SCNC: 24 MMOL/L (ref 21–32)
CREAT SERPL-MCNC: 1.1 MG/DL (ref 0.8–1.5)
GLOBULIN SER CALC-MCNC: 3 G/DL (ref 2.8–4.5)
GLUCOSE SERPL-MCNC: 127 MG/DL (ref 65–100)
HDLC SERPL-MCNC: 69 MG/DL (ref 40–60)
HDLC SERPL: 2.2
LDLC SERPL CALC-MCNC: 53.8 MG/DL
POTASSIUM SERPL-SCNC: 3.9 MMOL/L (ref 3.5–5.1)
PROT SERPL-MCNC: 6.7 G/DL (ref 6.3–8.2)
SODIUM SERPL-SCNC: 142 MMOL/L (ref 133–143)
TRIGL SERPL-MCNC: 131 MG/DL (ref 35–150)
VLDLC SERPL CALC-MCNC: 26.2 MG/DL (ref 6–23)

## 2023-06-30 ENCOUNTER — OFFICE VISIT (OUTPATIENT)
Dept: PULMONOLOGY | Age: 63
End: 2023-06-30
Payer: COMMERCIAL

## 2023-06-30 VITALS
SYSTOLIC BLOOD PRESSURE: 120 MMHG | BODY MASS INDEX: 31.34 KG/M2 | RESPIRATION RATE: 20 BRPM | DIASTOLIC BLOOD PRESSURE: 80 MMHG | HEIGHT: 66 IN | OXYGEN SATURATION: 95 % | WEIGHT: 195 LBS | TEMPERATURE: 98 F | HEART RATE: 54 BPM

## 2023-06-30 DIAGNOSIS — J44.9 OBSTRUCTIVE LUNG DISEASE (GENERALIZED) (HCC): ICD-10-CM

## 2023-06-30 DIAGNOSIS — R06.02 SOB (SHORTNESS OF BREATH): Primary | ICD-10-CM

## 2023-06-30 PROCEDURE — 99214 OFFICE O/P EST MOD 30 MIN: CPT | Performed by: INTERNAL MEDICINE

## 2023-07-01 ASSESSMENT — ENCOUNTER SYMPTOMS
COUGH: 0
SORE THROAT: 0
ABDOMINAL PAIN: 0
WHEEZING: 0
DIFFICULTY BREATHING: 0
CHEST TIGHTNESS: 1
SHORTNESS OF BREATH: 0
PHOTOPHOBIA: 0
ABDOMINAL DISTENTION: 0
NAUSEA: 0
EYE PAIN: 0
COLOR CHANGE: 0
BLOOD IN STOOL: 0

## 2023-07-17 ENCOUNTER — HOSPITAL ENCOUNTER (OUTPATIENT)
Dept: ULTRASOUND IMAGING | Age: 63
Discharge: HOME | End: 2023-07-20
Attending: FAMILY MEDICINE
Payer: COMMERCIAL

## 2023-07-17 DIAGNOSIS — R09.89 DECREASED PULSES IN FEET: ICD-10-CM

## 2023-07-17 DIAGNOSIS — R68.89 ABNORMAL ANKLE BRACHIAL INDEX (ABI): ICD-10-CM

## 2023-07-17 PROCEDURE — 93925 LOWER EXTREMITY STUDY: CPT

## 2023-08-06 ASSESSMENT — PATIENT HEALTH QUESTIONNAIRE - PHQ9
SUM OF ALL RESPONSES TO PHQ QUESTIONS 1-9: 0
2. FEELING DOWN, DEPRESSED OR HOPELESS: 0
2. FEELING DOWN, DEPRESSED OR HOPELESS: NOT AT ALL
SUM OF ALL RESPONSES TO PHQ QUESTIONS 1-9: 0
SUM OF ALL RESPONSES TO PHQ9 QUESTIONS 1 & 2: 0
1. LITTLE INTEREST OR PLEASURE IN DOING THINGS: NOT AT ALL
1. LITTLE INTEREST OR PLEASURE IN DOING THINGS: 0
SUM OF ALL RESPONSES TO PHQ QUESTIONS 1-9: 0
SUM OF ALL RESPONSES TO PHQ QUESTIONS 1-9: 0
SUM OF ALL RESPONSES TO PHQ9 QUESTIONS 1 & 2: 0

## 2023-08-09 ENCOUNTER — OFFICE VISIT (OUTPATIENT)
Dept: FAMILY MEDICINE CLINIC | Facility: CLINIC | Age: 63
End: 2023-08-09
Payer: COMMERCIAL

## 2023-08-09 VITALS
BODY MASS INDEX: 31.34 KG/M2 | SYSTOLIC BLOOD PRESSURE: 122 MMHG | HEART RATE: 60 BPM | TEMPERATURE: 97.6 F | DIASTOLIC BLOOD PRESSURE: 70 MMHG | WEIGHT: 194.2 LBS | OXYGEN SATURATION: 96 %

## 2023-08-09 DIAGNOSIS — S69.91XS INJURY OF RIGHT HAND, SEQUELA: ICD-10-CM

## 2023-08-09 DIAGNOSIS — L98.9 SKIN LESION OF LEFT ARM: ICD-10-CM

## 2023-08-09 DIAGNOSIS — Z12.11 SCREEN FOR COLON CANCER: Primary | ICD-10-CM

## 2023-08-09 DIAGNOSIS — M79.641 RIGHT HAND PAIN: ICD-10-CM

## 2023-08-09 DIAGNOSIS — R74.01 ELEVATED ALANINE AMINOTRANSFERASE (ALT) LEVEL: ICD-10-CM

## 2023-08-09 DIAGNOSIS — M19.049 HAND ARTHRITIS: ICD-10-CM

## 2023-08-09 LAB — ALT SERPL-CCNC: 55 U/L (ref 12–65)

## 2023-08-09 PROCEDURE — 99214 OFFICE O/P EST MOD 30 MIN: CPT | Performed by: FAMILY MEDICINE

## 2023-08-10 ASSESSMENT — ENCOUNTER SYMPTOMS
EYE PAIN: 0
SORE THROAT: 0
NAUSEA: 0
COLOR CHANGE: 0
PHOTOPHOBIA: 0
WHEEZING: 0
BLOOD IN STOOL: 0
ABDOMINAL DISTENTION: 0
COUGH: 0
CHEST TIGHTNESS: 0
ABDOMINAL PAIN: 0
SHORTNESS OF BREATH: 0

## 2023-08-11 NOTE — PROGRESS NOTES
Tere Reese III  1960 is a 60 y.o. male ,Established patient, here for evaluation of the following chief complaint(s):   Chief Complaint   Patient presents with    1 Month Follow-Up     Right hand has a knot on pointer finger and it is painful would like to discuss this. 1. Screen for colon cancer  -     Cologuard (Fecal DNA Colorectal Cancer Screening)  2. Skin lesion of left arm  -     AFL - Evette rOtiz MD, PA  3. Elevated alanine aminotransferase (ALT) level  -     ALT; Future  4. Right hand pain  -     Hortencia Padilla MD, [facility]   5. Hand arthritis  -     Hortencia Padilla MD, Orthopedic Surgery, [address]  6. Injury of right hand, sequela        Return in about 3 months (around 11/9/2023). Subjective   SUBJECTIVE/OBJECTIVE:  XRAY HAND---mild degenerative disease    No evidence of arterial disease in lower extremities per doppler. Asthma  There is no chest tightness, cough, shortness of breath or wheezing. This is a chronic problem. The problem occurs intermittently. The problem has been resolved. Pertinent negatives include no chest pain, dyspnea on exertion, ear pain, fever, headaches, myalgias or sore throat. His past medical history is significant for asthma. Hand Pain   The incident occurred more than 1 week ago. The pain is present in the right hand. The quality of the pain is described as cramping. The pain is at a severity of 5/10. The pain is moderate. The pain has been Fluctuating since the incident. Pertinent negatives include no chest pain or muscle weakness. Skin Problem  This is a new problem. The current episode started more than 1 month ago. The problem is unchanged (skin lesion new on arm). Pertinent negatives include no cough, eye pain, fever, shortness of breath or sore throat. His past medical history is significant for asthma. Review of Systems   Constitutional:  Negative for chills and fever.    HENT:

## 2023-08-26 LAB — NONINV COLON CA DNA+OCC BLD SCRN STL QL: NEGATIVE

## 2023-08-29 ENCOUNTER — OFFICE VISIT (OUTPATIENT)
Dept: ORTHOPEDIC SURGERY | Age: 63
End: 2023-08-29
Payer: COMMERCIAL

## 2023-08-29 VITALS — WEIGHT: 195 LBS | BODY MASS INDEX: 30.61 KG/M2 | HEIGHT: 67 IN

## 2023-08-29 DIAGNOSIS — M19.041 DEGENERATIVE ARTHRITIS OF METACARPOPHALANGEAL JOINT OF INDEX FINGER OF RIGHT HAND: Primary | ICD-10-CM

## 2023-08-29 PROCEDURE — 99214 OFFICE O/P EST MOD 30 MIN: CPT | Performed by: ORTHOPAEDIC SURGERY

## 2023-08-29 NOTE — PROGRESS NOTES
[facility] Note    Name: Raul Munguia  YOB: 1960  Gender: male  MRN: [id number]      Follow up visit:   1. Degenerative arthritis of metacarpophalangeal joint of index finger of right hand        HPI: Raul Munguia is a 60 y.o. male who turns with a new complaint of pain and burning in his right index finger, particularly at the metacarpophalangeal joint, but occasionally radiating to the tip of his finger. He denies any numbness or tingling, and no other digits seem to be affected. ROS/Meds/PSH/PMH/FH/SH: I personally reviewed the patients standard intake form. Pertinents are discussed in the HPI    Physical Examination:    Musculoskeletal Examination:  Examination on the right upper extremity demonstrates cap refill < 5 seconds in all fingers, skin is intact, there is no soft tissue swelling. He is tenderness palpation which is very mild at the index metacarpophalangeal joint. There is crepitus with range of motion, and MCP flexion is slightly limited, but he has minimal pain with range of motion. No tenderness throughout the digit more distally. Light touch sensation is intact in median, ulnar, radial distributions. He has a negative Tinel at the carpal tunnel, negative carpal tunnel compression and Phalen's test..    Imaging / Electrodiagnostic Tests:     I independently reviewed and interpreted right hand radiographs. They demonstrate moderate degenerative changes at the index and middle metacarpophalangeal joints, severe degenerative changes at the index distal interphalangeal joint      Assessment:     ICD-10-CM    1. Degenerative arthritis of metacarpophalangeal joint of index finger of right hand  M19.041           Plan:   We discussed the diagnosis and different treatment options. We discussed observation, therapy, antiinflammatory medications and other pertinent treatment modalities.     After discussing in detail the patient elects to proceed with

## 2023-11-13 ENCOUNTER — HOSPITAL ENCOUNTER (OUTPATIENT)
Dept: GENERAL RADIOLOGY | Age: 63
Discharge: HOME | End: 2023-11-16
Payer: COMMERCIAL

## 2023-11-13 ENCOUNTER — OFFICE VISIT (OUTPATIENT)
Dept: FAMILY MEDICINE CLINIC | Facility: CLINIC | Age: 63
End: 2023-11-13
Payer: COMMERCIAL

## 2023-11-13 VITALS
BODY MASS INDEX: 30.76 KG/M2 | WEIGHT: 196.4 LBS | SYSTOLIC BLOOD PRESSURE: 120 MMHG | OXYGEN SATURATION: 96 % | DIASTOLIC BLOOD PRESSURE: 62 MMHG | HEART RATE: 63 BPM | TEMPERATURE: 97.8 F

## 2023-11-13 DIAGNOSIS — J40 BRONCHITIS: ICD-10-CM

## 2023-11-13 DIAGNOSIS — R25.2 LEG CRAMPS: ICD-10-CM

## 2023-11-13 DIAGNOSIS — R05.1 ACUTE COUGH: ICD-10-CM

## 2023-11-13 DIAGNOSIS — N40.0 BENIGN PROSTATIC HYPERPLASIA WITHOUT LOWER URINARY TRACT SYMPTOMS: ICD-10-CM

## 2023-11-13 DIAGNOSIS — E78.2 MIXED HYPERLIPIDEMIA: ICD-10-CM

## 2023-11-13 DIAGNOSIS — R73.03 PREDIABETES: Primary | ICD-10-CM

## 2023-11-13 PROCEDURE — 71046 X-RAY EXAM CHEST 2 VIEWS: CPT

## 2023-11-13 PROCEDURE — 99214 OFFICE O/P EST MOD 30 MIN: CPT | Performed by: FAMILY MEDICINE

## 2023-11-13 RX ORDER — BENZONATATE 100 MG/1
100 CAPSULE ORAL 2 TIMES DAILY PRN
Qty: 14 CAPSULE | Refills: 0 | Status: SHIPPED | OUTPATIENT
Start: 2023-11-13 | End: 2023-11-20

## 2023-11-13 RX ORDER — AZITHROMYCIN 250 MG/1
250 TABLET, FILM COATED ORAL SEE ADMIN INSTRUCTIONS
Qty: 6 TABLET | Refills: 0 | Status: SHIPPED | OUTPATIENT
Start: 2023-11-13 | End: 2023-11-18

## 2023-11-14 ASSESSMENT — ENCOUNTER SYMPTOMS: ABDOMINAL PAIN: 0

## 2023-11-14 NOTE — PROGRESS NOTES
Felix Serrano III  1960 is a 58 y.o. male ,Established patient, here for evaluation of the following chief complaint(s):   Chief Complaint   Patient presents with    3 Month Follow-Up     Does have a bit of a cough that has been going on for about 2-3 weeks, at times gets some mucus up- has been getting cramps in his legs and was asking about potassium- pt is not fasting           1. Prediabetes  -     Hemoglobin A1C; Future  2. Mixed hyperlipidemia  -     Lipid Panel; Future  3. Benign prostatic hyperplasia without lower urinary tract symptoms  -     CBC with Auto Differential; Future  -     Comprehensive Metabolic Panel; Future  4. Leg cramps  -     Comprehensive Metabolic Panel; Future  -     Magnesium; Future  -     Phosphorus; Future  5. Acute cough  -     XR CHEST PA LAT (2 VIEWS); Future  -     benzonatate (TESSALON) 100 MG capsule; Take 1 capsule by mouth 2 times daily as needed for Cough, Disp-14 capsule, R-0Normal  6. Bronchitis  -     azithromycin (ZITHROMAX) 250 MG tablet; Take 1 tablet by mouth See Admin Instructions for 5 days 500mg on day 1 followed by 250mg on days 2 - 5, Disp-6 tablet, R-0Normal        Return in about 9 days (around 11/22/2023). Subjective   SUBJECTIVE/OBJECTIVE:  XRAYS --hands show arthritis--MCP INDEX----ALSO  FIT TEST NEGATIVE===AIC 6.2    Chest Congestion  This is a new problem. The current episode started in the past 7 days. The problem occurs daily. The problem has been unchanged. Associated symptoms include congestion. Pertinent negatives include no abdominal pain, anorexia, arthralgias, chest pain, chills, diaphoresis, myalgias or rash. Hyperlipidemia  This is a chronic problem. The current episode started more than 1 year ago. The problem is controlled. Recent lipid tests were reviewed and are normal. Pertinent negatives include no chest pain or myalgias. Review of Systems   Constitutional:  Negative for chills and diaphoresis.    HENT:  Positive for

## 2023-11-15 ENCOUNTER — NURSE ONLY (OUTPATIENT)
Dept: FAMILY MEDICINE CLINIC | Facility: CLINIC | Age: 63
End: 2023-11-15

## 2023-11-15 DIAGNOSIS — E78.2 MIXED HYPERLIPIDEMIA: ICD-10-CM

## 2023-11-15 DIAGNOSIS — R73.03 PREDIABETES: ICD-10-CM

## 2023-11-15 DIAGNOSIS — N40.0 BENIGN PROSTATIC HYPERPLASIA WITHOUT LOWER URINARY TRACT SYMPTOMS: ICD-10-CM

## 2023-11-15 DIAGNOSIS — R25.2 LEG CRAMPS: ICD-10-CM

## 2023-11-15 LAB
BASOPHILS # BLD: 0 K/UL (ref 0–0.2)
BASOPHILS NFR BLD: 1 % (ref 0–2)
DIFFERENTIAL METHOD BLD: ABNORMAL
EOSINOPHIL # BLD: 0.1 K/UL (ref 0–0.8)
EOSINOPHIL NFR BLD: 1 % (ref 0.5–7.8)
ERYTHROCYTE [DISTWIDTH] IN BLOOD BY AUTOMATED COUNT: 12.5 % (ref 11.9–14.6)
HCT VFR BLD AUTO: 48.5 % (ref 41.1–50.3)
HGB BLD-MCNC: 16.4 G/DL (ref 13.6–17.2)
IMM GRANULOCYTES # BLD AUTO: 0 K/UL (ref 0–0.5)
IMM GRANULOCYTES NFR BLD AUTO: 0 % (ref 0–5)
LYMPHOCYTES # BLD: 1.1 K/UL (ref 0.5–4.6)
LYMPHOCYTES NFR BLD: 20 % (ref 13–44)
MCH RBC QN AUTO: 29.9 PG (ref 26.1–32.9)
MCHC RBC AUTO-ENTMCNC: 33.8 G/DL (ref 31.4–35)
MCV RBC AUTO: 88.3 FL (ref 82–102)
MONOCYTES # BLD: 0.4 K/UL (ref 0.1–1.3)
MONOCYTES NFR BLD: 8 % (ref 4–12)
NEUTS SEG # BLD: 3.8 K/UL (ref 1.7–8.2)
NEUTS SEG NFR BLD: 70 % (ref 43–78)
NRBC # BLD: 0 K/UL (ref 0–0.2)
PLATELET # BLD AUTO: 131 K/UL (ref 150–450)
PMV BLD AUTO: 12.7 FL (ref 9.4–12.3)
RBC # BLD AUTO: 5.49 M/UL (ref 4.23–5.6)
WBC # BLD AUTO: 5.4 K/UL (ref 4.3–11.1)

## 2023-11-16 LAB
ALBUMIN SERPL-MCNC: 4 G/DL (ref 3.2–4.6)
ALBUMIN/GLOB SERPL: 1.4 (ref 0.4–1.6)
ALP SERPL-CCNC: 120 U/L (ref 50–136)
ALT SERPL-CCNC: 66 U/L (ref 12–65)
ANION GAP SERPL CALC-SCNC: 8 MMOL/L (ref 2–11)
AST SERPL-CCNC: 28 U/L (ref 15–37)
BILIRUB SERPL-MCNC: 1.3 MG/DL (ref 0.2–1.1)
BUN SERPL-MCNC: 17 MG/DL (ref 8–23)
CALCIUM SERPL-MCNC: 8.5 MG/DL (ref 8.3–10.4)
CHLORIDE SERPL-SCNC: 113 MMOL/L (ref 101–110)
CHOLEST SERPL-MCNC: 163 MG/DL
CO2 SERPL-SCNC: 21 MMOL/L (ref 21–32)
CREAT SERPL-MCNC: 1.1 MG/DL (ref 0.8–1.5)
EST. AVERAGE GLUCOSE BLD GHB EST-MCNC: 134 MG/DL
GLOBULIN SER CALC-MCNC: 2.8 G/DL (ref 2.8–4.5)
GLUCOSE SERPL-MCNC: 131 MG/DL (ref 65–100)
HBA1C MFR BLD: 6.3 % (ref 4.8–5.6)
HDLC SERPL-MCNC: 77 MG/DL (ref 40–60)
HDLC SERPL: 2.1
LDLC SERPL CALC-MCNC: 77.6 MG/DL
MAGNESIUM SERPL-MCNC: 1.9 MG/DL (ref 1.8–2.4)
PHOSPHATE SERPL-MCNC: 2.7 MG/DL (ref 2.3–3.7)
POTASSIUM SERPL-SCNC: 3.9 MMOL/L (ref 3.5–5.1)
PROT SERPL-MCNC: 6.8 G/DL (ref 6.3–8.2)
SODIUM SERPL-SCNC: 142 MMOL/L (ref 133–143)
TRIGL SERPL-MCNC: 42 MG/DL (ref 35–150)
VLDLC SERPL CALC-MCNC: 8.4 MG/DL (ref 6–23)

## 2023-11-22 ENCOUNTER — OFFICE VISIT (OUTPATIENT)
Dept: FAMILY MEDICINE CLINIC | Facility: CLINIC | Age: 63
End: 2023-11-22
Payer: COMMERCIAL

## 2023-11-22 VITALS
HEART RATE: 55 BPM | TEMPERATURE: 97.8 F | BODY MASS INDEX: 30.45 KG/M2 | WEIGHT: 194 LBS | SYSTOLIC BLOOD PRESSURE: 134 MMHG | DIASTOLIC BLOOD PRESSURE: 70 MMHG | HEIGHT: 67 IN | OXYGEN SATURATION: 95 %

## 2023-11-22 DIAGNOSIS — R05.1 ACUTE COUGH: Primary | ICD-10-CM

## 2023-11-22 DIAGNOSIS — R25.2 LEG CRAMPS: ICD-10-CM

## 2023-11-22 PROCEDURE — 99213 OFFICE O/P EST LOW 20 MIN: CPT | Performed by: PHYSICIAN ASSISTANT

## 2023-11-22 ASSESSMENT — PATIENT HEALTH QUESTIONNAIRE - PHQ9
SUM OF ALL RESPONSES TO PHQ QUESTIONS 1-9: 0
SUM OF ALL RESPONSES TO PHQ9 QUESTIONS 1 & 2: 0
1. LITTLE INTEREST OR PLEASURE IN DOING THINGS: 0
2. FEELING DOWN, DEPRESSED OR HOPELESS: 0
SUM OF ALL RESPONSES TO PHQ QUESTIONS 1-9: 0

## 2023-11-22 NOTE — PROGRESS NOTES
[address] [address]  Phone: [phone]  Fax [phone]  Provider : Reece Richard PA-C      Patient: Marguerite Gomes  YOB: 1960  Patient Age 58 y.o. Patient sex: male  Medical Record:  [id number]  Visit Date:11/22/2023   Author:  Christopher Trinidad. [facility] Note     Chief complaint  Marguerite Gomes  is a 58 y.o. male who was seen on 11/24/23  4:09 PM  for the following reasons:    Chief Complaint   Patient presents with    Follow-up     Cough--feels better    Leg Pain     cramps       Current Medical problems addressed  Cough - He notes he has had a cough and starating to feel better but still coughing at times and needs inhaler  Leg cramps- wants to eval cause for leg pain. He is drinking fluids and does not feel its dehydration. ASSESSMENT AND PLAN    ICD-10-CM    1. Acute cough  R05.1       2. Leg cramps  R25.2          Jorje Dawson was seen today for follow-up and leg pain. Diagnoses and all orders for this visit:    Acute cough    Leg cramps      Plan includes the following:  Sent for labs if it persists advised to start magnesium and potassium otc and drink at least 60 oz of water daily     No follow-up provider specified. No orders of the defined types were placed in this encounter. Past Medical History:   Allergies:No Known Allergies  Current Medications:   Medications marked \"taking\" at this time:  Current Outpatient Medications   Medication Sig Dispense Refill    finasteride (PROSCAR) 5 MG tablet Take 1 tablet by mouth daily 30 tablet 11    silodosin (RAPAFLO) 4 MG CAPS capsule Take one capsule by mouth once daily 30 capsule 11    albuterol sulfate HFA (PROVENTIL;VENTOLIN;PROAIR) 108 (90 Base) MCG/ACT inhaler Inhale 2 puffs into the lungs every 6 hours as needed for Wheezing 1 each 11    fluticasone furoate-vilanterol (BREO ELLIPTA) 200-25 MCG/ACT AEPB inhaler Inhale 1 puff into the lungs daily 1 each 11     No current

## 2023-11-24 PROBLEM — R05.1 ACUTE COUGH: Status: ACTIVE | Noted: 2023-11-24

## 2023-11-24 PROBLEM — R25.2 LEG CRAMPS: Status: ACTIVE | Noted: 2023-11-24

## 2023-11-24 ASSESSMENT — ENCOUNTER SYMPTOMS
BACK PAIN: 0
COUGH: 1

## 2023-12-13 ENCOUNTER — OFFICE VISIT (OUTPATIENT)
Dept: FAMILY MEDICINE CLINIC | Facility: CLINIC | Age: 63
End: 2023-12-13
Payer: COMMERCIAL

## 2023-12-13 VITALS
DIASTOLIC BLOOD PRESSURE: 70 MMHG | OXYGEN SATURATION: 97 % | SYSTOLIC BLOOD PRESSURE: 126 MMHG | TEMPERATURE: 98 F | HEART RATE: 71 BPM | WEIGHT: 195.4 LBS | BODY MASS INDEX: 30.6 KG/M2

## 2023-12-13 DIAGNOSIS — J02.8 ACUTE PHARYNGITIS DUE TO OTHER SPECIFIED ORGANISMS: Primary | ICD-10-CM

## 2023-12-13 PROCEDURE — 99213 OFFICE O/P EST LOW 20 MIN: CPT | Performed by: FAMILY MEDICINE

## 2023-12-13 RX ORDER — AMOXICILLIN 875 MG/1
875 TABLET, COATED ORAL 2 TIMES DAILY
Qty: 20 TABLET | Refills: 0 | Status: SHIPPED | OUTPATIENT
Start: 2023-12-13 | End: 2023-12-23

## 2023-12-14 NOTE — PROGRESS NOTES
Gurwinder Alegria III  1960 is a 58 y.o. male ,Established patient, here for evaluation of the following chief complaint(s):   Chief Complaint   Patient presents with    Pharyngitis     For about 3 days now as not been feeling well- sore throat-weak- denies chest congestion runny nose fever- no body aches-           1. Acute pharyngitis due to other specified organisms  -     amoxicillin (AMOXIL) 875 MG tablet; Take 1 tablet by mouth 2 times daily for 10 days, Disp-20 tablet, R-0Normal        Return in about 2 months (around 2/23/2024). Subjective   SUBJECTIVE/OBJECTIVE:  Pharyngitis  This is a new problem. The current episode started in the past 7 days. The problem occurs constantly. The problem has been unchanged. Associated symptoms include fatigue and a sore throat. Pertinent negatives include no abdominal pain, chest pain, chills, coughing, fever, headaches, joint swelling, myalgias, nausea, rash or weakness. Review of Systems   Constitutional:  Positive for fatigue. Negative for chills and fever. HENT:  Positive for sore throat. Negative for ear pain and hearing loss. Eyes:  Negative for photophobia and pain. Respiratory:  Negative for cough and shortness of breath. Cardiovascular:  Negative for chest pain, palpitations and leg swelling. Gastrointestinal:  Negative for abdominal distention, abdominal pain, blood in stool and nausea. Genitourinary:  Negative for dysuria and urgency. Musculoskeletal:  Negative for joint swelling and myalgias. Skin:  Negative for color change, pallor and rash. Neurological:  Negative for speech difficulty, weakness, light-headedness and headaches. Hematological:  Negative for adenopathy. Psychiatric/Behavioral:  Negative for agitation, confusion, hallucinations, self-injury and suicidal ideas. Objective   Physical Exam  Constitutional:       Appearance: Normal appearance. HENT:      Head: Normocephalic and atraumatic.

## 2024-01-05 ENCOUNTER — OFFICE VISIT (OUTPATIENT)
Dept: FAMILY MEDICINE CLINIC | Facility: CLINIC | Age: 64
End: 2024-01-05
Payer: COMMERCIAL

## 2024-01-05 VITALS
TEMPERATURE: 97.6 F | SYSTOLIC BLOOD PRESSURE: 126 MMHG | OXYGEN SATURATION: 96 % | DIASTOLIC BLOOD PRESSURE: 74 MMHG | WEIGHT: 194.2 LBS | BODY MASS INDEX: 30.48 KG/M2 | HEIGHT: 67 IN | HEART RATE: 59 BPM

## 2024-01-05 DIAGNOSIS — R05.1 ACUTE COUGH: Primary | ICD-10-CM

## 2024-01-05 DIAGNOSIS — H66.001 NON-RECURRENT ACUTE SUPPURATIVE OTITIS MEDIA OF RIGHT EAR WITHOUT SPONTANEOUS RUPTURE OF TYMPANIC MEMBRANE: ICD-10-CM

## 2024-01-05 PROCEDURE — 99213 OFFICE O/P EST LOW 20 MIN: CPT | Performed by: PHYSICIAN ASSISTANT

## 2024-01-05 RX ORDER — AMOXICILLIN 500 MG/1
500 CAPSULE ORAL 2 TIMES DAILY
Qty: 20 CAPSULE | Refills: 0 | Status: SHIPPED | OUTPATIENT
Start: 2024-01-05 | End: 2024-01-15

## 2024-01-05 ASSESSMENT — PATIENT HEALTH QUESTIONNAIRE - PHQ9
1. LITTLE INTEREST OR PLEASURE IN DOING THINGS: 0
SUM OF ALL RESPONSES TO PHQ9 QUESTIONS 1 & 2: 0
2. FEELING DOWN, DEPRESSED OR HOPELESS: NOT AT ALL
SUM OF ALL RESPONSES TO PHQ9 QUESTIONS 1 & 2: 0
2. FEELING DOWN, DEPRESSED OR HOPELESS: 0
SUM OF ALL RESPONSES TO PHQ QUESTIONS 1-9: 0
1. LITTLE INTEREST OR PLEASURE IN DOING THINGS: NOT AT ALL
SUM OF ALL RESPONSES TO PHQ QUESTIONS 1-9: 0

## 2024-01-05 ASSESSMENT — ENCOUNTER SYMPTOMS
SHORTNESS OF BREATH: 0
COUGH: 1
SINUS PAIN: 1

## 2024-01-05 NOTE — PROGRESS NOTES
Medications:   Medications marked \"taking\" at this time:  Current Outpatient Medications   Medication Sig Dispense Refill    amoxicillin (AMOXIL) 500 MG capsule Take 1 capsule by mouth 2 times daily for 10 days 20 capsule 0    finasteride (PROSCAR) 5 MG tablet Take 1 tablet by mouth daily 30 tablet 11    silodosin (RAPAFLO) 4 MG CAPS capsule Take one capsule by mouth once daily 30 capsule 11    albuterol sulfate HFA (PROVENTIL;VENTOLIN;PROAIR) 108 (90 Base) MCG/ACT inhaler Inhale 2 puffs into the lungs every 6 hours as needed for Wheezing 1 each 11    fluticasone furoate-vilanterol (BREO ELLIPTA) 200-25 MCG/ACT AEPB inhaler Inhale 1 puff into the lungs daily 1 each 11     No current facility-administered medications for this visit.        Current Problem List:   Patient Active Problem List   Diagnosis    Bradycardia    Family history of ischemic heart disease (IHD)    Encounter for screening for malignant neoplasm of colon    Bradycardia on ECG    Other fatigue    EKG abnormalities    Coronary artery disease involving native coronary artery of native heart without angina pectoris    Leg cramps    Acute cough       Social History:   Social History     Tobacco Use    Smoking status: Never    Smokeless tobacco: Never    Tobacco comments:     None   Substance Use Topics    Alcohol use: No       Family History:   Family History   Problem Relation Age of Onset    No Known Problems Brother     Pulmonary Fibrosis Mother     Lung Disease Mother     No Known Problems Sister     Heart Disease Father         CABG, Aortic valve replacement       Surgical History:  Past Surgical History:   Procedure Laterality Date    APPENDECTOMY      HERNIA REPAIR  2016    [facility]    ORTHOPEDIC SURGERY Right     RIGHT FOOT    ORTHOPEDIC SURGERY Bilateral     Thumbs    ORTHOPEDIC SURGERY      Both hands, Right foot    HI APPENDECTOMY         ROS  Review of Systems   Constitutional:  Positive for fatigue. Negative for fever.   HENT:  Positive for

## 2024-02-13 ENCOUNTER — OFFICE VISIT (OUTPATIENT)
Dept: FAMILY MEDICINE CLINIC | Facility: CLINIC | Age: 64
End: 2024-02-13
Payer: COMMERCIAL

## 2024-02-13 VITALS
BODY MASS INDEX: 30.37 KG/M2 | HEIGHT: 66 IN | HEART RATE: 59 BPM | TEMPERATURE: 98 F | WEIGHT: 189 LBS | OXYGEN SATURATION: 97 % | SYSTOLIC BLOOD PRESSURE: 114 MMHG | DIASTOLIC BLOOD PRESSURE: 60 MMHG

## 2024-02-13 DIAGNOSIS — R73.03 PREDIABETES: Primary | ICD-10-CM

## 2024-02-13 DIAGNOSIS — J45.20 MILD INTERMITTENT ASTHMA WITHOUT COMPLICATION: ICD-10-CM

## 2024-02-13 DIAGNOSIS — E78.2 MIXED HYPERLIPIDEMIA: ICD-10-CM

## 2024-02-13 DIAGNOSIS — N40.1 BENIGN PROSTATIC HYPERPLASIA WITH URINARY FREQUENCY: ICD-10-CM

## 2024-02-13 DIAGNOSIS — R35.0 BENIGN PROSTATIC HYPERPLASIA WITH URINARY FREQUENCY: ICD-10-CM

## 2024-02-13 LAB
BILIRUBIN, URINE, POC: NORMAL
BLOOD URINE, POC: NEGATIVE
GLUCOSE URINE, POC: NORMAL
HBA1C MFR BLD: 6.1 %
KETONES, URINE, POC: NORMAL
LEUKOCYTE ESTERASE, URINE, POC: NORMAL
NITRITE, URINE, POC: NEGATIVE
PH, URINE, POC: 7 (ref 4.6–8)
PROTEIN,URINE, POC: NORMAL
SPECIFIC GRAVITY, URINE, POC: 1.02 (ref 1–1.03)
URINALYSIS CLARITY, POC: CLEAR
URINALYSIS COLOR, POC: YELLOW
UROBILINOGEN, POC: NORMAL

## 2024-02-13 PROCEDURE — 83036 HEMOGLOBIN GLYCOSYLATED A1C: CPT | Performed by: FAMILY MEDICINE

## 2024-02-13 PROCEDURE — 81003 URINALYSIS AUTO W/O SCOPE: CPT | Performed by: FAMILY MEDICINE

## 2024-02-13 PROCEDURE — 99214 OFFICE O/P EST MOD 30 MIN: CPT | Performed by: FAMILY MEDICINE

## 2024-02-15 NOTE — PROGRESS NOTES
nausea.   Genitourinary:  Negative for dysuria and urgency.   Musculoskeletal:  Negative for joint swelling and myalgias.   Skin:  Negative for color change, pallor and rash.   Neurological:  Negative for speech difficulty, weakness, light-headedness and headaches.   Hematological:  Negative for adenopathy.   Psychiatric/Behavioral:  Negative for agitation, confusion, hallucinations, self-injury and suicidal ideas.           Objective   Physical Exam  Constitutional:       Appearance: Normal appearance.   HENT:      Head: Normocephalic and atraumatic.      Nose: Nose normal.   Eyes:      Extraocular Movements: Extraocular movements intact.      Conjunctiva/sclera: Conjunctivae normal.      Pupils: Pupils are equal, round, and reactive to light.   Cardiovascular:      Rate and Rhythm: Normal rate and regular rhythm.      Pulses: Normal pulses.      Heart sounds: Normal heart sounds.   Pulmonary:      Effort: Pulmonary effort is normal.      Breath sounds: Normal breath sounds.   Abdominal:      General: Abdomen is flat. Bowel sounds are normal.      Palpations: Abdomen is soft.   Skin:     General: Skin is warm and dry.   Neurological:      General: No focal deficit present.      Mental Status: He is alert and oriented to person, place, and time.   Psychiatric:         Mood and Affect: Mood normal.                   An electronic signature was used to authenticate this note.    --Zia Patrick MD

## 2024-03-06 ENCOUNTER — NURSE ONLY (OUTPATIENT)
Dept: PULMONOLOGY | Age: 64
End: 2024-03-06

## 2024-03-06 DIAGNOSIS — R06.02 SOB (SHORTNESS OF BREATH): ICD-10-CM

## 2024-03-06 DIAGNOSIS — J44.9 OBSTRUCTIVE LUNG DISEASE (GENERALIZED) (HCC): Primary | ICD-10-CM

## 2024-03-06 LAB
FEF25-27, POC: 1.36 L/S
FEV 1 , POC: 1.75 L
FEV1/FVC, POC: 75
FVC, POC: 2.33
LUNG AGE, POC: NORMAL
PEF, POC: 5.31 L/S

## 2024-03-06 ASSESSMENT — PULMONARY FUNCTION TESTS
FVC_POC: 2.33
FEV1/FVC_POC: 75

## 2024-03-25 ENCOUNTER — OFFICE VISIT (OUTPATIENT)
Dept: PULMONOLOGY | Age: 64
End: 2024-03-25
Payer: COMMERCIAL

## 2024-03-25 VITALS
HEIGHT: 66 IN | DIASTOLIC BLOOD PRESSURE: 70 MMHG | BODY MASS INDEX: 30.37 KG/M2 | OXYGEN SATURATION: 97 % | WEIGHT: 189 LBS | RESPIRATION RATE: 17 BRPM | TEMPERATURE: 97.1 F | SYSTOLIC BLOOD PRESSURE: 148 MMHG | HEART RATE: 66 BPM

## 2024-03-25 DIAGNOSIS — R06.02 SOB (SHORTNESS OF BREATH): ICD-10-CM

## 2024-03-25 DIAGNOSIS — J45.40 MODERATE PERSISTENT ASTHMA WITHOUT COMPLICATION: ICD-10-CM

## 2024-03-25 DIAGNOSIS — J44.9 OBSTRUCTIVE LUNG DISEASE (GENERALIZED) (HCC): Primary | ICD-10-CM

## 2024-03-25 DIAGNOSIS — R09.82 POSTNASAL DRIP: ICD-10-CM

## 2024-03-25 DIAGNOSIS — J30.1 SEASONAL ALLERGIC RHINITIS DUE TO POLLEN: ICD-10-CM

## 2024-03-25 PROCEDURE — 99214 OFFICE O/P EST MOD 30 MIN: CPT | Performed by: INTERNAL MEDICINE

## 2024-03-25 RX ORDER — MONTELUKAST SODIUM 10 MG/1
10 TABLET ORAL DAILY
Qty: 30 TABLET | Refills: 11 | Status: SHIPPED | OUTPATIENT
Start: 2024-03-25

## 2024-03-25 NOTE — PROGRESS NOTES
crackles.   HEART:   There is a regular rate and rhythm.  No murmur, rub, or gallop.  There is no edema in the lower extremities.   ABDOMEN:   Soft and non-tender.  No hepatosplenomegaly.  Bowel sounds are normal.     SKIN:   There are no rashes, cyanosis, jaundice, or ecchymosis present.   EXTREMITIES:   The extremities are unremarkable without clubbing, cyanosis, joint inflammation, degenerative, or ischemic change.   MUSCULOSKELETAL:   There is no abnormal tone, muscle atrophy, or abnormal movement present.   NEURO:   The patient is alert and oriented to person, place, and time.  Memory appears intact and mood is normal.  No gross sensorimotor deficits are present.      DIAGNOSTIC TESTS:      CXR:                 Spirometry:  08/26/2022      Moderate to severe obstruction with very mild gas trapping and decreased diffusion capacity          FeNO testing March 6, 2023:  Fractional exhaled nitric oxide testing was performed with level of 26 ppb.  This is indicative of intermediate likelihood of eosinophilic inflammation/asthma.    Corby Marcum MD.         Exercise oximetry on room air on August 26, 2022.    Baseline oxygen saturation 99%, baseline heart rate 73 bpm, at peak exercise the lowest oxygen saturation noted was 96%, the highest heart rate noted was 98 bpm.    Impression:    This is a normal exercise oximetry with no evidence of exertional desaturation on room air    Corby Marcum MD.  IgE 6 - 495 IU/mL 107      Eosinophils Absolute K/UL 0.07  0.1 R  0.1 R  0.1 R  0.1 R      ASSESSMENT:  (Medical Decision Making)       Patient Active Problem List   Diagnosis    Bradycardia    Family history of ischemic heart disease (IHD)    Encounter for screening for malignant neoplasm of colon    Bradycardia on ECG    Other fatigue    EKG abnormalities    Coronary artery disease involving native coronary artery of native heart without angina pectoris    Leg cramps    Acute cough           PLAN:  Encounter

## 2024-04-03 RX ORDER — FLUTICASONE FUROATE AND VILANTEROL TRIFENATATE 200; 25 UG/1; UG/1
1 POWDER RESPIRATORY (INHALATION) DAILY
Qty: 3 EACH | Refills: 4 | Status: SHIPPED | OUTPATIENT
Start: 2024-04-03

## 2024-04-08 ENCOUNTER — NURSE ONLY (OUTPATIENT)
Dept: UROLOGY | Age: 64
End: 2024-04-08

## 2024-04-08 DIAGNOSIS — N40.1 BENIGN PROSTATIC HYPERPLASIA WITH URINARY OBSTRUCTION: ICD-10-CM

## 2024-04-08 DIAGNOSIS — N13.8 BENIGN PROSTATIC HYPERPLASIA WITH URINARY OBSTRUCTION: ICD-10-CM

## 2024-04-08 LAB — PSA SERPL-MCNC: 3.1 NG/ML

## 2024-04-10 ASSESSMENT — ENCOUNTER SYMPTOMS: BACK PAIN: 0

## 2024-04-10 NOTE — PROGRESS NOTES
[facility]  [address] [place] [id number]  [phone]          Jose Daniel Osorio III  : 1960    Chief Complaint   Patient presents with    Follow-up     yearly          HPI     Jose Daniel Osorio III is a 63 y.o. male referred by Dr. Patrick in  in regards to BPH/LUTS.  His chief complaint was slow stream.  It had gradually been worsening over 2 years.  He has no nocturia.  He has no family history of ACP.       He was started on tamsulosin in .  In , it was switched to rapaflo and finasteride was started as well.  LUTS have improved. Still occasional low volume (no pad) UUI/dribbling.     PSA: 10/8/20 2.7,  3.3, 3/23 2.5,  3.1    Past Medical History:   Diagnosis Date    Asthma 3/15/23    Bilateral inguinal hernia     BMI 29.0-29.9,adult     BMI 29.1    Erectile dysfunction     Obesity Year ago    None     Past Surgical History:   Procedure Laterality Date    APPENDECTOMY      HERNIA REPAIR      [facility]    ORTHOPEDIC SURGERY Right     RIGHT FOOT    ORTHOPEDIC SURGERY Bilateral     Thumbs    ORTHOPEDIC SURGERY      Both hands, Right foot    SD APPENDECTOMY       Current Outpatient Medications   Medication Sig Dispense Refill    finasteride (PROSCAR) 5 MG tablet Take 1 tablet by mouth daily 90 tablet 3    silodosin (RAPAFLO) 4 MG CAPS capsule Take one capsule by mouth once daily 90 capsule 3    BREO ELLIPTA 200-25 MCG/ACT AEPB inhaler INHALE 1 PUFF INTO THE LUNGS DAILY 3 each 4    montelukast (SINGULAIR) 10 MG tablet Take 1 tablet by mouth daily 30 tablet 11    albuterol sulfate HFA (PROVENTIL;VENTOLIN;PROAIR) 108 (90 Base) MCG/ACT inhaler Inhale 2 puffs into the lungs every 6 hours as needed for Wheezing 1 each 11     No current facility-administered medications for this visit.     No Known Allergies  Social History     Socioeconomic History    Marital status:      Spouse name: Not on file    Number of children: Not on file    Years of

## 2024-04-11 ENCOUNTER — OFFICE VISIT (OUTPATIENT)
Dept: UROLOGY | Age: 64
End: 2024-04-11
Payer: COMMERCIAL

## 2024-04-11 DIAGNOSIS — N13.8 BENIGN PROSTATIC HYPERPLASIA WITH URINARY OBSTRUCTION: Primary | ICD-10-CM

## 2024-04-11 DIAGNOSIS — N40.1 BENIGN PROSTATIC HYPERPLASIA WITH URINARY OBSTRUCTION: Primary | ICD-10-CM

## 2024-04-11 LAB
BILIRUBIN, URINE, POC: NEGATIVE
BLOOD URINE, POC: NEGATIVE
GLUCOSE URINE, POC: NEGATIVE
KETONES, URINE, POC: NEGATIVE
LEUKOCYTE ESTERASE, URINE, POC: NEGATIVE
NITRITE, URINE, POC: NEGATIVE
PH, URINE, POC: 7 (ref 4.6–8)
PROTEIN,URINE, POC: NEGATIVE
SPECIFIC GRAVITY, URINE, POC: 1.02 (ref 1–1.03)
URINALYSIS CLARITY, POC: NORMAL
URINALYSIS COLOR, POC: NORMAL
UROBILINOGEN, POC: NORMAL

## 2024-04-11 PROCEDURE — 81003 URINALYSIS AUTO W/O SCOPE: CPT | Performed by: UROLOGY

## 2024-04-11 PROCEDURE — 99213 OFFICE O/P EST LOW 20 MIN: CPT | Performed by: UROLOGY

## 2024-04-11 RX ORDER — FINASTERIDE 5 MG/1
TABLET, FILM COATED ORAL
Qty: 90 TABLET | Refills: 3 | Status: SHIPPED | OUTPATIENT
Start: 2024-04-11

## 2024-04-11 RX ORDER — SILODOSIN 4 MG/1
CAPSULE ORAL
Qty: 90 CAPSULE | Refills: 3 | Status: SHIPPED | OUTPATIENT
Start: 2024-04-11

## 2024-04-14 DIAGNOSIS — N13.8 BENIGN PROSTATIC HYPERPLASIA WITH URINARY OBSTRUCTION: ICD-10-CM

## 2024-04-14 DIAGNOSIS — N40.1 BENIGN PROSTATIC HYPERPLASIA WITH URINARY OBSTRUCTION: ICD-10-CM

## 2024-04-15 RX ORDER — SILODOSIN 4 MG/1
CAPSULE ORAL
Qty: 90 CAPSULE | Refills: 3 | OUTPATIENT
Start: 2024-04-15

## 2024-05-10 SDOH — ECONOMIC STABILITY: FOOD INSECURITY: WITHIN THE PAST 12 MONTHS, THE FOOD YOU BOUGHT JUST DIDN'T LAST AND YOU DIDN'T HAVE MONEY TO GET MORE.: NEVER TRUE

## 2024-05-10 SDOH — ECONOMIC STABILITY: TRANSPORTATION INSECURITY
IN THE PAST 12 MONTHS, HAS LACK OF TRANSPORTATION KEPT YOU FROM MEETINGS, WORK, OR FROM GETTING THINGS NEEDED FOR DAILY LIVING?: NO

## 2024-05-10 SDOH — ECONOMIC STABILITY: FOOD INSECURITY: WITHIN THE PAST 12 MONTHS, YOU WORRIED THAT YOUR FOOD WOULD RUN OUT BEFORE YOU GOT MONEY TO BUY MORE.: NEVER TRUE

## 2024-05-10 SDOH — ECONOMIC STABILITY: INCOME INSECURITY: HOW HARD IS IT FOR YOU TO PAY FOR THE VERY BASICS LIKE FOOD, HOUSING, MEDICAL CARE, AND HEATING?: NOT HARD AT ALL

## 2024-05-13 ENCOUNTER — OFFICE VISIT (OUTPATIENT)
Dept: FAMILY MEDICINE CLINIC | Facility: CLINIC | Age: 64
End: 2024-05-13
Payer: COMMERCIAL

## 2024-05-13 VITALS
TEMPERATURE: 97.9 F | DIASTOLIC BLOOD PRESSURE: 70 MMHG | BODY MASS INDEX: 30.34 KG/M2 | OXYGEN SATURATION: 96 % | SYSTOLIC BLOOD PRESSURE: 120 MMHG | WEIGHT: 188.8 LBS | HEIGHT: 66 IN | HEART RATE: 66 BPM

## 2024-05-13 DIAGNOSIS — G44.311 INTRACTABLE ACUTE POST-TRAUMATIC HEADACHE: ICD-10-CM

## 2024-05-13 DIAGNOSIS — R53.81 MALAISE AND FATIGUE: Primary | ICD-10-CM

## 2024-05-13 DIAGNOSIS — R53.83 MALAISE AND FATIGUE: Primary | ICD-10-CM

## 2024-05-13 DIAGNOSIS — G44.52 NEW DAILY PERSISTENT HEADACHE: ICD-10-CM

## 2024-05-13 DIAGNOSIS — R73.03 PREDIABETES: ICD-10-CM

## 2024-05-13 DIAGNOSIS — R42 DIZZINESS: ICD-10-CM

## 2024-05-13 DIAGNOSIS — E78.49 OTHER HYPERLIPIDEMIA: ICD-10-CM

## 2024-05-13 LAB
ALBUMIN SERPL-MCNC: 4 G/DL (ref 3.2–4.6)
ALBUMIN/GLOB SERPL: 1.7 (ref 1–1.9)
ALP SERPL-CCNC: 123 U/L (ref 40–129)
ALT SERPL-CCNC: 39 U/L (ref 12–65)
ANION GAP SERPL CALC-SCNC: 8 MMOL/L (ref 9–18)
AST SERPL-CCNC: 23 U/L (ref 15–37)
BASOPHILS # BLD: 0 K/UL (ref 0–0.2)
BASOPHILS NFR BLD: 0 % (ref 0–2)
BILIRUB SERPL-MCNC: 2.1 MG/DL (ref 0–1.2)
BUN SERPL-MCNC: 19 MG/DL (ref 8–23)
CALCIUM SERPL-MCNC: 9.1 MG/DL (ref 8.8–10.2)
CHLORIDE SERPL-SCNC: 107 MMOL/L (ref 98–107)
CHOLEST SERPL-MCNC: 179 MG/DL (ref 0–200)
CO2 SERPL-SCNC: 26 MMOL/L (ref 20–28)
CREAT SERPL-MCNC: 0.95 MG/DL (ref 0.8–1.3)
DIFFERENTIAL METHOD BLD: ABNORMAL
EOSINOPHIL # BLD: 0.1 K/UL (ref 0–0.8)
EOSINOPHIL NFR BLD: 1 % (ref 0.5–7.8)
ERYTHROCYTE [DISTWIDTH] IN BLOOD BY AUTOMATED COUNT: 13.1 % (ref 11.9–14.6)
EST. AVERAGE GLUCOSE BLD GHB EST-MCNC: 146 MG/DL
GLOBULIN SER CALC-MCNC: 2.3 G/DL (ref 2.3–3.5)
GLUCOSE SERPL-MCNC: 133 MG/DL (ref 70–99)
HBA1C MFR BLD: 6.7 % (ref 0–5.6)
HCT VFR BLD AUTO: 48.3 % (ref 41.1–50.3)
HDLC SERPL-MCNC: 89 MG/DL (ref 40–60)
HDLC SERPL: 2 (ref 0–5)
HGB BLD-MCNC: 15.9 G/DL (ref 13.6–17.2)
IMM GRANULOCYTES # BLD AUTO: 0 K/UL (ref 0–0.5)
IMM GRANULOCYTES NFR BLD AUTO: 0 % (ref 0–5)
LDLC SERPL CALC-MCNC: 79 MG/DL (ref 0–100)
LYMPHOCYTES # BLD: 1.3 K/UL (ref 0.5–4.6)
LYMPHOCYTES NFR BLD: 18 % (ref 13–44)
MCH RBC QN AUTO: 29.5 PG (ref 26.1–32.9)
MCHC RBC AUTO-ENTMCNC: 32.9 G/DL (ref 31.4–35)
MCV RBC AUTO: 89.6 FL (ref 82–102)
MONOCYTES # BLD: 0.4 K/UL (ref 0.1–1.3)
MONOCYTES NFR BLD: 6 % (ref 4–12)
NEUTS SEG # BLD: 5.3 K/UL (ref 1.7–8.2)
NEUTS SEG NFR BLD: 75 % (ref 43–78)
NRBC # BLD: 0 K/UL (ref 0–0.2)
PLATELET # BLD AUTO: 126 K/UL (ref 150–450)
PMV BLD AUTO: 12.3 FL (ref 9.4–12.3)
POTASSIUM SERPL-SCNC: 4 MMOL/L (ref 3.5–5.1)
PROT SERPL-MCNC: 6.3 G/DL (ref 6.3–8.2)
RBC # BLD AUTO: 5.39 M/UL (ref 4.23–5.6)
SODIUM SERPL-SCNC: 140 MMOL/L (ref 136–145)
TRIGL SERPL-MCNC: 52 MG/DL (ref 0–150)
TSH, 3RD GENERATION: 1.31 UIU/ML (ref 0.27–4.2)
VLDLC SERPL CALC-MCNC: 10 MG/DL (ref 6–23)
WBC # BLD AUTO: 7.1 K/UL (ref 4.3–11.1)

## 2024-05-13 PROCEDURE — 99214 OFFICE O/P EST MOD 30 MIN: CPT | Performed by: FAMILY MEDICINE

## 2024-05-14 DIAGNOSIS — E11.9 TYPE 2 DIABETES MELLITUS WITHOUT COMPLICATION, WITHOUT LONG-TERM CURRENT USE OF INSULIN (HCC): Primary | ICD-10-CM

## 2024-05-14 RX ORDER — METFORMIN HYDROCHLORIDE 500 MG/1
500 TABLET, EXTENDED RELEASE ORAL
Qty: 30 TABLET | Refills: 5 | Status: SHIPPED | OUTPATIENT
Start: 2024-05-14

## 2024-05-14 ASSESSMENT — ENCOUNTER SYMPTOMS
NAUSEA: 0
COUGH: 0
SORE THROAT: 0
ABDOMINAL DISTENTION: 0
BLOOD IN STOOL: 0
SHORTNESS OF BREATH: 0
WHEEZING: 0
PHOTOPHOBIA: 0
COLOR CHANGE: 0
EYE PAIN: 0
ABDOMINAL PAIN: 0
DIFFICULTY BREATHING: 0

## 2024-05-14 NOTE — PROGRESS NOTES
Jose Daniel Osorio III  1960 is a 63 y.o. male ,Established patient, here for evaluation of the following chief complaint(s):   Chief Complaint   Patient presents with    3 Month Follow-Up     Pt is fasting- past couple weekends has been tired would like to touch base on this-           1. Malaise and fatigue  -     CBC with Auto Differential; Future  -     Comprehensive Metabolic Panel; Future  -     Testosterone Total Only, Male; Future  -     TSH; Future  2. Other hyperlipidemia  -     Lipid Panel; Future  3. Intractable acute post-traumatic headache  -     CT HEAD WO CONTRAST; Future  4. Dizziness  -     CT HEAD WO CONTRAST; Future  5. Prediabetes  -     Hemoglobin A1C; Future        Return in about 10 days (around 5/23/2024).        Subjective   SUBJECTIVE/OBJECTIVE:  Patient states that a piece of machinery hit his head and since that time he has had headaches.    Fatigue  This is a recurrent problem. The current episode started 1 to 4 weeks ago. The problem occurs daily. Associated symptoms include fatigue. Pertinent negatives include no abdominal pain, chest pain, chills, coughing, fever, headaches, joint swelling, myalgias, nausea, rash, sore throat or weakness.   Asthma  There is no chest tightness, cough, difficulty breathing, shortness of breath or wheezing. The current episode started more than 1 month ago. The problem occurs intermittently. The problem has been rapidly improving. Associated symptoms include malaise/fatigue. Pertinent negatives include no chest pain, ear pain, fever, headaches, myalgias or sore throat. His past medical history is significant for asthma.       Review of Systems   Constitutional:  Positive for fatigue and malaise/fatigue. Negative for chills and fever.   HENT:  Negative for ear pain, hearing loss and sore throat.    Eyes:  Negative for photophobia and pain.   Respiratory:  Negative for cough, shortness of breath and wheezing.    Cardiovascular:  Negative for chest

## 2024-05-15 LAB — TESTOST SERPL-MCNC: 394 NG/DL (ref 264–916)

## 2024-05-16 ENCOUNTER — HOSPITAL ENCOUNTER (OUTPATIENT)
Dept: CT IMAGING | Age: 64
Discharge: HOME | End: 2024-05-16
Attending: FAMILY MEDICINE
Payer: COMMERCIAL

## 2024-05-16 DIAGNOSIS — R42 DIZZINESS: ICD-10-CM

## 2024-05-16 DIAGNOSIS — G44.311 INTRACTABLE ACUTE POST-TRAUMATIC HEADACHE: ICD-10-CM

## 2024-05-16 PROCEDURE — 70450 CT HEAD/BRAIN W/O DYE: CPT

## 2024-05-23 ENCOUNTER — OFFICE VISIT (OUTPATIENT)
Dept: FAMILY MEDICINE CLINIC | Facility: CLINIC | Age: 64
End: 2024-05-23
Payer: COMMERCIAL

## 2024-05-23 VITALS
BODY MASS INDEX: 29.96 KG/M2 | TEMPERATURE: 98.1 F | DIASTOLIC BLOOD PRESSURE: 68 MMHG | SYSTOLIC BLOOD PRESSURE: 120 MMHG | OXYGEN SATURATION: 97 % | WEIGHT: 186.4 LBS | HEART RATE: 74 BPM | HEIGHT: 66 IN

## 2024-05-23 DIAGNOSIS — E11.9 TYPE 2 DIABETES MELLITUS WITHOUT COMPLICATION, WITHOUT LONG-TERM CURRENT USE OF INSULIN (HCC): ICD-10-CM

## 2024-05-23 DIAGNOSIS — D69.6 THROMBOCYTOPENIA (HCC): Primary | ICD-10-CM

## 2024-05-23 LAB — GLUCOSE, POC: 151 MG/DL

## 2024-05-23 PROCEDURE — 3044F HG A1C LEVEL LT 7.0%: CPT | Performed by: FAMILY MEDICINE

## 2024-05-23 PROCEDURE — 82962 GLUCOSE BLOOD TEST: CPT | Performed by: FAMILY MEDICINE

## 2024-05-23 PROCEDURE — 99213 OFFICE O/P EST LOW 20 MIN: CPT | Performed by: FAMILY MEDICINE

## 2024-07-03 ENCOUNTER — OFFICE VISIT (OUTPATIENT)
Dept: FAMILY MEDICINE CLINIC | Facility: CLINIC | Age: 64
End: 2024-07-03
Payer: COMMERCIAL

## 2024-07-03 VITALS
HEIGHT: 66 IN | WEIGHT: 182.6 LBS | TEMPERATURE: 97.8 F | OXYGEN SATURATION: 96 % | BODY MASS INDEX: 29.35 KG/M2 | HEART RATE: 60 BPM | DIASTOLIC BLOOD PRESSURE: 62 MMHG | SYSTOLIC BLOOD PRESSURE: 120 MMHG

## 2024-07-03 DIAGNOSIS — E11.9 TYPE 2 DIABETES MELLITUS WITHOUT COMPLICATION, WITHOUT LONG-TERM CURRENT USE OF INSULIN (HCC): ICD-10-CM

## 2024-07-03 DIAGNOSIS — J30.9 ALLERGIC RHINITIS, UNSPECIFIED SEASONALITY, UNSPECIFIED TRIGGER: Primary | ICD-10-CM

## 2024-07-03 PROCEDURE — 99213 OFFICE O/P EST LOW 20 MIN: CPT | Performed by: FAMILY MEDICINE

## 2024-07-03 PROCEDURE — 3044F HG A1C LEVEL LT 7.0%: CPT | Performed by: FAMILY MEDICINE

## 2024-07-03 RX ORDER — LORATADINE 10 MG/1
10 TABLET ORAL DAILY
Qty: 30 TABLET | Refills: 3 | Status: SHIPPED | OUTPATIENT
Start: 2024-07-03

## 2024-07-05 ASSESSMENT — ENCOUNTER SYMPTOMS
COLOR CHANGE: 0
ABDOMINAL PAIN: 0
ABDOMINAL DISTENTION: 0
BLURRED VISION: 0
COUGH: 0
PHOTOPHOBIA: 0
EYE PAIN: 0
SORE THROAT: 0
NAUSEA: 0
BLOOD IN STOOL: 0
SHORTNESS OF BREATH: 0

## 2024-07-06 NOTE — PROGRESS NOTES
Jose Daniel Osorio [facility]  1960 is a 63 y.o. male ,Established patient, here for evaluation of the following chief complaint(s):   Chief Complaint   Patient presents with    1 Month Follow-Up     Pt is not fasting- did bring in sugars and list of foods he has been eating each day          1. Allergic rhinitis, unspecified seasonality, unspecified trigger  -     loratadine (CLARITIN) 10 MG tablet; Take 1 tablet by mouth daily, Disp-30 tablet, R-3Normal  2. Type 2 diabetes mellitus without complication, without long-term current use of insulin ([facility])--food diary is reviewed with patient and necessary adjustments made in diet. Plan to cut out sugars and carbs---no more than 20 g carb / serving and no more than 10 g sugar/ serving.        Return in about 2 months (around 9/3/2024).        Subjective   SUBJECTIVE/OBJECTIVE:  He has brought list of sugars and foods today.    Diabetes  He presents for his follow-up diabetic visit. He has type 2 diabetes mellitus. His disease course has been stable. Pertinent negatives for hypoglycemia include no confusion, headaches, pallor or speech difficulty. Pertinent negatives for diabetes include no blurred vision, no chest pain, no fatigue, no foot paresthesias and no weakness. Symptoms are stable.   Allergies  Presents for follow-up visit. He reports no cough, ear pain, fatigue, fever, headaches, rash or sore throat. The problem occurs daily.       Review of Systems   Constitutional:  Negative for chills, fatigue and fever.   HENT:  Negative for ear pain, hearing loss and sore throat.    Eyes:  Negative for blurred vision, photophobia and pain.   Respiratory:  Negative for cough and shortness of breath.    Cardiovascular:  Negative for chest pain, palpitations and leg swelling.   Gastrointestinal:  Negative for abdominal distention, abdominal pain, blood in stool and nausea.   Genitourinary:  Negative for dysuria and urgency.   Musculoskeletal:  Negative for joint swelling and

## 2024-07-26 NOTE — PROGRESS NOTES
NEW PATIENT INTAKE    Referral Diagnosis: Thrombocytopenia      Referring Provider: Zia Patrick MD    Primary Care Provider: Zia Patrick MD     Family History of Cancer/ Hematology Disorders: Father with unknown cancer    Presenting Symptoms: Thrombocytopenia    Chronological History of Pertinent Events:     62yo white male    PMH: DM II, Asthma, Bilateral inguinal hernia, ED, Obesity  Followed by GI, Urology, Pulmonology    12/13/23 - Met with PCP ([facility])  Here with c/o pharyngitis x 3 days, fatigue  Rx: Amoxil provided.  RTC in 2 months  Abnormal labs (11/15/23 - [facility])  Chloride - 113   Glucose - 131  HDL Chol - 77  ALT - 66  Total Bilirubin - 1.3  Hgb A1C - 6.3  MPV - 12.7   Platelets - 131    5/23/24 - Met with PCP ([facility])  Here for f/u  Thrombocytopenia noted.  Present to initial diabetic visit.   Abnormal labs (5/13/24 - EP[facility])  Anion Gap - 8   Glucose - 133  HDL Chol - 89  Total Bilirubin - 2.1  Hgb A1C - 6.7  Platelets - 126    A referral has been placed with [facility] for a Medical Hematology consultation and treatment.    (EPIC)   Latest Reference Range & Units 10/08/20 08:49 11/10/20 08:56 05/24/21 11:26 07/08/21 12:27 12/30/21 09:41   Platelet Count 150 - 450 K/uL 139 (L) 149 (L) 142 (L) 151 118 (L)      Latest Reference Range & Units 01/31/22 11:28 04/04/22 10:12 07/12/22 10:33 10/13/22 11:01 03/27/23 10:03   Platelet Count 150 - 450 K/uL 133 (L) 138 (L) 136 (L) 137 (L) 129 (L)      Latest Reference Range & Units 06/28/23 10:27 11/15/23 09:32 05/13/24 11:51   Platelet Count 150 - 450 K/uL 138 (L) 131 (L) 126 (L)       Notes from Referring Provider: N/A    Presented at Tumor Board: No    Other Pertinent Information: N/A

## 2024-07-30 ENCOUNTER — HOSPITAL ENCOUNTER (OUTPATIENT)
Dept: LAB | Age: 64
Discharge: HOME | End: 2024-08-02
Payer: COMMERCIAL

## 2024-07-30 ENCOUNTER — OFFICE VISIT (OUTPATIENT)
Dept: ONCOLOGY | Age: 64
End: 2024-07-30
Payer: COMMERCIAL

## 2024-07-30 VITALS
HEART RATE: 55 BPM | WEIGHT: 180.9 LBS | RESPIRATION RATE: 18 BRPM | SYSTOLIC BLOOD PRESSURE: 129 MMHG | HEIGHT: 66 IN | OXYGEN SATURATION: 96 % | TEMPERATURE: 97.8 F | DIASTOLIC BLOOD PRESSURE: 72 MMHG | BODY MASS INDEX: 29.07 KG/M2

## 2024-07-30 DIAGNOSIS — D69.6 THROMBOCYTOPENIA (HCC): Primary | ICD-10-CM

## 2024-07-30 DIAGNOSIS — D69.6 THROMBOCYTOPENIA (HCC): ICD-10-CM

## 2024-07-30 LAB
25(OH)D3 SERPL-MCNC: 45.1 NG/ML (ref 30–100)
ALBUMIN SERPL-MCNC: 4.3 G/DL (ref 3.2–4.6)
ALBUMIN/GLOB SERPL: 1.8 (ref 1–1.9)
ALP SERPL-CCNC: 111 U/L (ref 40–129)
ALT SERPL-CCNC: 31 U/L (ref 12–65)
ANION GAP SERPL CALC-SCNC: 11 MMOL/L (ref 9–18)
AST SERPL-CCNC: 23 U/L (ref 15–37)
BASOPHILS # BLD: 0 K/UL (ref 0–0.2)
BASOPHILS NFR BLD: 1 % (ref 0–2)
BILIRUB SERPL-MCNC: 1.9 MG/DL (ref 0–1.2)
BUN SERPL-MCNC: 16 MG/DL (ref 8–23)
CALCIUM SERPL-MCNC: 9.2 MG/DL (ref 8.8–10.2)
CHLORIDE SERPL-SCNC: 107 MMOL/L (ref 98–107)
CO2 SERPL-SCNC: 23 MMOL/L (ref 20–28)
CREAT SERPL-MCNC: 0.86 MG/DL (ref 0.8–1.3)
DIFFERENTIAL METHOD BLD: ABNORMAL
EOSINOPHIL # BLD: 0 K/UL (ref 0–0.8)
EOSINOPHIL NFR BLD: 1 % (ref 0.5–7.8)
ERYTHROCYTE [DISTWIDTH] IN BLOOD BY AUTOMATED COUNT: 12.6 % (ref 11.9–14.6)
GLOBULIN SER CALC-MCNC: 2.4 G/DL (ref 2.3–3.5)
GLUCOSE SERPL-MCNC: 99 MG/DL (ref 70–99)
HAV IGM SER QL: NONREACTIVE
HBV CORE IGM SER QL: NONREACTIVE
HBV SURFACE AG SER QL: NONREACTIVE
HCT VFR BLD AUTO: 49.2 % (ref 41.1–50.3)
HCV AB SER QL: NONREACTIVE
HGB BLD-MCNC: 16.7 G/DL (ref 13.6–17.2)
HIV 1+2 AB+HIV1 P24 AG SERPL QL IA: NONREACTIVE
HIV 1/2 RESULT COMMENT: NORMAL
IMM GRANULOCYTES # BLD AUTO: 0 K/UL (ref 0–0.5)
IMM GRANULOCYTES NFR BLD AUTO: 0 % (ref 0–5)
LYMPHOCYTES # BLD: 1.4 K/UL (ref 0.5–4.6)
LYMPHOCYTES NFR BLD: 21 % (ref 13–44)
MCH RBC QN AUTO: 29.8 PG (ref 26.1–32.9)
MCHC RBC AUTO-ENTMCNC: 33.9 G/DL (ref 31.4–35)
MCV RBC AUTO: 87.9 FL (ref 82–102)
MONOCYTES # BLD: 0.5 K/UL (ref 0.1–1.3)
MONOCYTES NFR BLD: 7 % (ref 4–12)
NEUTS SEG # BLD: 4.5 K/UL (ref 1.7–8.2)
NEUTS SEG NFR BLD: 70 % (ref 43–78)
NRBC # BLD: 0 K/UL (ref 0–0.2)
PLATELET # BLD AUTO: 149 K/UL (ref 150–450)
PMV BLD AUTO: 11.8 FL (ref 9.4–12.3)
POTASSIUM SERPL-SCNC: 4 MMOL/L (ref 3.5–5.1)
PROT SERPL-MCNC: 6.8 G/DL (ref 6.3–8.2)
RBC # BLD AUTO: 5.6 M/UL (ref 4.23–5.6)
SODIUM SERPL-SCNC: 141 MMOL/L (ref 136–145)
VIT B12 SERPL-MCNC: 337 PG/ML (ref 193–986)
WBC # BLD AUTO: 6.5 K/UL (ref 4.3–11.1)

## 2024-07-30 PROCEDURE — 80074 ACUTE HEPATITIS PANEL: CPT

## 2024-07-30 PROCEDURE — 83520 IMMUNOASSAY QUANT NOS NONAB: CPT

## 2024-07-30 PROCEDURE — 86148 ANTI-PHOSPHOLIPID ANTIBODY: CPT

## 2024-07-30 PROCEDURE — 86334 IMMUNOFIX E-PHORESIS SERUM: CPT

## 2024-07-30 PROCEDURE — 85613 RUSSELL VIPER VENOM DILUTED: CPT

## 2024-07-30 PROCEDURE — 85025 COMPLETE CBC W/AUTO DIFF WBC: CPT

## 2024-07-30 PROCEDURE — 80053 COMPREHEN METABOLIC PANEL: CPT

## 2024-07-30 PROCEDURE — 99205 OFFICE O/P NEW HI 60 MIN: CPT | Performed by: INTERNAL MEDICINE

## 2024-07-30 PROCEDURE — 87389 HIV-1 AG W/HIV-1&-2 AB AG IA: CPT

## 2024-07-30 PROCEDURE — 85732 THROMBOPLASTIN TIME PARTIAL: CPT

## 2024-07-30 PROCEDURE — 85670 THROMBIN TIME PLASMA: CPT

## 2024-07-30 PROCEDURE — 85705 THROMBOPLASTIN INHIBITION: CPT

## 2024-07-30 PROCEDURE — 86146 BETA-2 GLYCOPROTEIN ANTIBODY: CPT

## 2024-07-30 PROCEDURE — 82306 VITAMIN D 25 HYDROXY: CPT

## 2024-07-30 PROCEDURE — 36415 COLL VENOUS BLD VENIPUNCTURE: CPT

## 2024-07-30 PROCEDURE — 82784 ASSAY IGA/IGD/IGG/IGM EACH: CPT

## 2024-07-30 PROCEDURE — 84165 PROTEIN E-PHORESIS SERUM: CPT

## 2024-07-30 PROCEDURE — 82607 VITAMIN B-12: CPT

## 2024-07-30 PROCEDURE — 86147 CARDIOLIPIN ANTIBODY EA IG: CPT

## 2024-07-30 NOTE — PROGRESS NOTES
[address] [address]  Phone: [phone]        7/30/2024  Jose Daniel Osorio III  1960  [id number]      Jose Daniel Osorio III is a 63 year old  man who has been sent to my clinic by Dr. Zia Patrick for evaluation of Thrombocytopenia.      ALLERGIES:    No known drug allergies.      FAMILY HISTORY:    No hematologic disorders.      SOCIAL HISTORY:   He is  and lives with his wife. He works as a . He denies ever using any tobacco products.      PAST MEDICAL HISTORY:    Diabetes Mellitus, BPH, Coronary Artery Disease and Asthma.      ROS:  The patient complained of fatigue; all other systems negative.       PHYSICAL EXAM:   The patient was alert, awake and oriented, no acute distress was noted. Oral examination did not reveal any mucosal lesions. Lymph node examination did not reveal any adenopathy. Neck examination revealed a supple neck, no thyromegaly or masses were noted. Chest examination revealed normal vesicular breath sounds. Heart examination revealed S-1 and S-2 without any murmurs. Abdominal examination revealed a non-tender abdomen, bowel sounds were positive, no organomegaly could be appreciated. Examination of the extremities did not reveal any tenderness or erythema. Examination of the skin did not reveal any lesions.      KPS:   100.      LABORATORY INVESTIGATIONS:  CBC showed a WBC count of 6.5, ANC was 4.5, Hemoglobin was 16.7 and Platelets were 149. Medical problems and test results were reviewed with the patient today.      ASSESSMENT:    Thrombocytopenia. I spent a total of 62 minutes on the day of the visit, managing the care of this patient.      PLAN:   I have sent blood work for an extensive work-up; at his next clinic visit I will re-check his CBC and CMP.      Thank you for allowing me to participate in the care of this patient.      Aftab Baeza MD  Hematology/BMT

## 2024-07-30 NOTE — PATIENT INSTRUCTIONS
Patient Information from Today's Visit    Diagnosis: Thrombocytopenia      Follow Up Instructions: About 6 weeks.    History reviewed.  Symptoms reviewed.  We will do blood work today.    Treatment Summary has been discussed and given to patient: N/A      Current Labs:   N/A      Please refer to After Visit Summary or rollApphart for upcoming appointment information. If you have any questions regarding your upcoming schedule please reach out to your care team through [employer] [facility] or call [phone].    Please notify your assigned Nurse Navigator of any unplanned hospital admissions or Emergency Room visits within 24 hours of discharge.    -------------------------------------------------------------------------------------------------------------------  Please call our office at [phone] if you have any  of the following symptoms:   Fever of 100.5 or greater  Chills  Shortness of breath  Swelling or pain in one leg    After office hours an answering service is available and will contact a provider for emergencies or if you are experiencing any of the above symptoms.        KAYLEIGH LAURA RN

## 2024-07-31 LAB
B2 GLYCOPROT1 IGA SER-ACNC: <9 GPI IGA UNITS (ref 0–25)
B2 GLYCOPROT1 IGG SER-ACNC: <9 GPI IGG UNITS (ref 0–20)
B2 GLYCOPROT1 IGM SER-ACNC: <9 GPI IGM UNITS (ref 0–32)

## 2024-08-01 LAB
APTT SCREEN TO CONFIRM RATIO: 1.28 RATIO (ref 0–1.34)
CARDIOLIPIN IGA SER IA-ACNC: <9 APL U/ML (ref 0–11)
CARDIOLIPIN IGG SER IA-ACNC: <9 GPL U/ML (ref 0–14)
CARDIOLIPIN IGM SER IA-ACNC: <9 MPL U/ML (ref 0–12)
CONFIRM APTT/NORMAL: 39.8 SEC (ref 0–47.6)
LA 2 SCREEN W REFLEX-IMP: NORMAL
P ETHANOLAMINE AB, IGA: NORMAL U/ML
P ETHANOLAMINE AB, IGG: NORMAL U/ML
P ETHANOLAMINE AB, IGM: NORMAL U/ML
P GLYCEROL IGA: NORMAL U/ML
P GLYCEROL IGG: NORMAL U/ML
P GLYCEROL IGM: NORMAL U/ML
PS IGA SER-ACNC: <1 APS UNITS (ref 0–19)
PS IGG SER-ACNC: 9 UNITS (ref 0–30)
PS IGM SER-ACNC: <10 UNITS (ref 0–30)
SCREEN APTT: 35.4 SEC (ref 0–43.5)
SCREEN DRVVT: 33 SEC (ref 0–47)
THROMBIN TIME: 19.2 SEC (ref 0–23)

## 2024-08-02 LAB
ALBUMIN SERPL ELPH-MCNC: 4 G/DL (ref 2.9–4.4)
ALBUMIN/GLOB SERPL: 1.6 (ref 0.7–1.7)
ALPHA1 GLOB SERPL ELPH-MCNC: 0.2 G/DL (ref 0–0.4)
ALPHA2 GLOB SERPL ELPH-MCNC: 0.5 G/DL (ref 0.4–1)
B-GLOBULIN SERPL ELPH-MCNC: 1 G/DL (ref 0.7–1.3)
GAMMA GLOB SERPL ELPH-MCNC: 0.9 G/DL (ref 0.4–1.8)
GLOBULIN SER-MCNC: 2.6 G/DL (ref 2.2–3.9)
IGA SERPL-MCNC: 118 MG/DL (ref 61–437)
IGG SERPL-MCNC: 866 MG/DL (ref 603–1613)
IGM SERPL-MCNC: 43 MG/DL (ref 20–172)
INTERPRETATION SERPL IEP-IMP: NORMAL
M PROTEIN SERPL ELPH-MCNC: NORMAL G/DL
PROT SERPL-MCNC: 6.6 G/DL (ref 6–8.5)

## 2024-08-12 LAB
CARDIOLIPIN IGA SER IA-ACNC: <9 APL U/ML (ref 0–11)
CARDIOLIPIN IGG SER IA-ACNC: <9 GPL U/ML (ref 0–14)
CARDIOLIPIN IGM SER IA-ACNC: <9 MPL U/ML (ref 0–12)
P ETHANOLAMINE AB, IGA: 1.6 U/ML
P ETHANOLAMINE AB, IGG: 0.3 U/ML
P ETHANOLAMINE AB, IGM: 0.5 U/ML
P GLYCEROL IGA: 3.4 U/ML
P GLYCEROL IGG: 1.9 U/ML
P GLYCEROL IGM: 1.3 U/ML
PS IGA SER-ACNC: <1 APS UNITS (ref 0–19)
PS IGG SER-ACNC: 9 UNITS (ref 0–30)
PS IGM SER-ACNC: <10 UNITS (ref 0–30)

## 2024-08-26 ENCOUNTER — OFFICE VISIT (OUTPATIENT)
Dept: FAMILY MEDICINE CLINIC | Facility: CLINIC | Age: 64
End: 2024-08-26
Payer: COMMERCIAL

## 2024-08-26 VITALS
DIASTOLIC BLOOD PRESSURE: 70 MMHG | SYSTOLIC BLOOD PRESSURE: 122 MMHG | TEMPERATURE: 97.3 F | HEIGHT: 66 IN | WEIGHT: 181 LBS | HEART RATE: 58 BPM | OXYGEN SATURATION: 96 % | BODY MASS INDEX: 29.09 KG/M2

## 2024-08-26 DIAGNOSIS — E11.9 TYPE 2 DIABETES MELLITUS WITHOUT COMPLICATION, WITHOUT LONG-TERM CURRENT USE OF INSULIN (HCC): Primary | ICD-10-CM

## 2024-08-26 LAB — HBA1C MFR BLD: 5.8 %

## 2024-08-26 PROCEDURE — 83036 HEMOGLOBIN GLYCOSYLATED A1C: CPT | Performed by: FAMILY MEDICINE

## 2024-08-26 PROCEDURE — 3044F HG A1C LEVEL LT 7.0%: CPT | Performed by: FAMILY MEDICINE

## 2024-08-26 PROCEDURE — 99213 OFFICE O/P EST LOW 20 MIN: CPT | Performed by: FAMILY MEDICINE

## 2024-08-27 ASSESSMENT — ENCOUNTER SYMPTOMS
BLOOD IN STOOL: 0
SHORTNESS OF BREATH: 0
PHOTOPHOBIA: 0
EYE PAIN: 0
COUGH: 0
SORE THROAT: 0
ABDOMINAL PAIN: 0
ABDOMINAL DISTENTION: 0
COLOR CHANGE: 0
BLURRED VISION: 0
NAUSEA: 0

## 2024-08-27 NOTE — PROGRESS NOTES
Jose Daniel Osorio [facility]  1960 is a 63 y.o. male ,Established patient, here for evaluation of the following chief complaint(s):   Chief Complaint   Patient presents with    Follow-up     Pt is not fasting-           1. Type 2 diabetes mellitus without complication, without long-term current use of insulin (HCC)  -     AMB POC HEMOGLOBIN A1C        No follow-ups on file.        Subjective   SUBJECTIVE/OBJECTIVE:  Diabetes  He presents for his follow-up diabetic visit. He has type 2 diabetes mellitus. His disease course has been improving. Pertinent negatives for hypoglycemia include no confusion, headaches, pallor or speech difficulty. Pertinent negatives for diabetes include no blurred vision, no chest pain and no weakness.       Review of Systems   Constitutional:  Negative for chills and fever.   HENT:  Negative for ear pain, hearing loss and sore throat.    Eyes:  Negative for blurred vision, photophobia and pain.   Respiratory:  Negative for cough and shortness of breath.    Cardiovascular:  Negative for chest pain, palpitations and leg swelling.   Gastrointestinal:  Negative for abdominal distention, abdominal pain, blood in stool and nausea.   Genitourinary:  Negative for dysuria and urgency.   Musculoskeletal:  Negative for joint swelling and myalgias.   Skin:  Negative for color change, pallor and rash.   Neurological:  Negative for speech difficulty, weakness, light-headedness and headaches.   Hematological:  Negative for adenopathy.   Psychiatric/Behavioral:  Negative for agitation, confusion, hallucinations, self-injury and suicidal ideas.           Objective   Physical Exam  Constitutional:       Appearance: Normal appearance.   HENT:      Head: Normocephalic and atraumatic.      Nose: Nose normal.   Eyes:      Extraocular Movements: Extraocular movements intact.      Conjunctiva/sclera: Conjunctivae normal.      Pupils: Pupils are equal, round, and reactive to light.   Cardiovascular:      Rate and

## 2024-09-09 DIAGNOSIS — D69.6 THROMBOCYTOPENIA (HCC): Primary | ICD-10-CM

## 2024-09-10 ENCOUNTER — HOSPITAL ENCOUNTER (OUTPATIENT)
Dept: LAB | Age: 64
Discharge: HOME | End: 2024-09-13
Payer: COMMERCIAL

## 2024-09-10 ENCOUNTER — OFFICE VISIT (OUTPATIENT)
Dept: ONCOLOGY | Age: 64
End: 2024-09-10
Payer: COMMERCIAL

## 2024-09-10 VITALS
OXYGEN SATURATION: 97 % | TEMPERATURE: 97.5 F | WEIGHT: 181.8 LBS | RESPIRATION RATE: 16 BRPM | DIASTOLIC BLOOD PRESSURE: 78 MMHG | SYSTOLIC BLOOD PRESSURE: 133 MMHG | HEART RATE: 49 BPM | BODY MASS INDEX: 29.22 KG/M2 | HEIGHT: 66 IN

## 2024-09-10 DIAGNOSIS — D69.6 THROMBOCYTOPENIA (HCC): Primary | ICD-10-CM

## 2024-09-10 DIAGNOSIS — D69.6 THROMBOCYTOPENIA (HCC): ICD-10-CM

## 2024-09-10 LAB
ALBUMIN SERPL-MCNC: 3.6 G/DL (ref 3.2–4.6)
ALBUMIN/GLOB SERPL: 1.5 (ref 1–1.9)
ALP SERPL-CCNC: 126 U/L (ref 40–129)
ALT SERPL-CCNC: 28 U/L (ref 12–65)
ANION GAP SERPL CALC-SCNC: 10 MMOL/L (ref 9–18)
AST SERPL-CCNC: 19 U/L (ref 15–37)
BASOPHILS # BLD: 0 K/UL (ref 0–0.2)
BASOPHILS NFR BLD: 1 % (ref 0–2)
BILIRUB SERPL-MCNC: 1.3 MG/DL (ref 0–1.2)
BUN SERPL-MCNC: 19 MG/DL (ref 8–23)
CALCIUM SERPL-MCNC: 9.1 MG/DL (ref 8.8–10.2)
CHLORIDE SERPL-SCNC: 110 MMOL/L (ref 98–107)
CO2 SERPL-SCNC: 24 MMOL/L (ref 20–28)
CREAT SERPL-MCNC: 0.92 MG/DL (ref 0.8–1.3)
DIFFERENTIAL METHOD BLD: ABNORMAL
EOSINOPHIL # BLD: 0.1 K/UL (ref 0–0.8)
EOSINOPHIL NFR BLD: 2 % (ref 0.5–7.8)
ERYTHROCYTE [DISTWIDTH] IN BLOOD BY AUTOMATED COUNT: 12.5 % (ref 11.9–14.6)
GLOBULIN SER CALC-MCNC: 2.4 G/DL (ref 2.3–3.5)
GLUCOSE SERPL-MCNC: 108 MG/DL (ref 70–99)
HCT VFR BLD AUTO: 45.9 % (ref 41.1–50.3)
HGB BLD-MCNC: 15.5 G/DL (ref 13.6–17.2)
IMM GRANULOCYTES # BLD AUTO: 0 K/UL (ref 0–0.5)
IMM GRANULOCYTES NFR BLD AUTO: 0 % (ref 0–5)
LYMPHOCYTES # BLD: 1.2 K/UL (ref 0.5–4.6)
LYMPHOCYTES NFR BLD: 23 % (ref 13–44)
MCH RBC QN AUTO: 29.8 PG (ref 26.1–32.9)
MCHC RBC AUTO-ENTMCNC: 33.8 G/DL (ref 31.4–35)
MCV RBC AUTO: 88.3 FL (ref 82–102)
MONOCYTES # BLD: 0.4 K/UL (ref 0.1–1.3)
MONOCYTES NFR BLD: 8 % (ref 4–12)
NEUTS SEG # BLD: 3.5 K/UL (ref 1.7–8.2)
NEUTS SEG NFR BLD: 66 % (ref 43–78)
NRBC # BLD: 0 K/UL (ref 0–0.2)
PLATELET # BLD AUTO: 127 K/UL (ref 150–450)
PMV BLD AUTO: 12.5 FL (ref 9.4–12.3)
POTASSIUM SERPL-SCNC: 3.9 MMOL/L (ref 3.5–5.1)
PROT SERPL-MCNC: 6 G/DL (ref 6.3–8.2)
RBC # BLD AUTO: 5.2 M/UL (ref 4.23–5.6)
SODIUM SERPL-SCNC: 144 MMOL/L (ref 136–145)
WBC # BLD AUTO: 5.2 K/UL (ref 4.3–11.1)

## 2024-09-10 PROCEDURE — 99214 OFFICE O/P EST MOD 30 MIN: CPT | Performed by: INTERNAL MEDICINE

## 2024-09-10 PROCEDURE — 85025 COMPLETE CBC W/AUTO DIFF WBC: CPT

## 2024-09-10 PROCEDURE — 36415 COLL VENOUS BLD VENIPUNCTURE: CPT

## 2024-09-10 PROCEDURE — 80053 COMPREHEN METABOLIC PANEL: CPT

## 2024-09-25 ENCOUNTER — NURSE ONLY (OUTPATIENT)
Dept: PULMONOLOGY | Age: 64
End: 2024-09-25
Payer: COMMERCIAL

## 2024-09-25 ENCOUNTER — OFFICE VISIT (OUTPATIENT)
Dept: PULMONOLOGY | Age: 64
End: 2024-09-25
Payer: COMMERCIAL

## 2024-09-25 VITALS
HEART RATE: 61 BPM | WEIGHT: 180 LBS | SYSTOLIC BLOOD PRESSURE: 124 MMHG | HEIGHT: 66 IN | DIASTOLIC BLOOD PRESSURE: 74 MMHG | RESPIRATION RATE: 16 BRPM | OXYGEN SATURATION: 97 % | BODY MASS INDEX: 28.93 KG/M2 | TEMPERATURE: 97.3 F

## 2024-09-25 DIAGNOSIS — J30.1 SEASONAL ALLERGIC RHINITIS DUE TO POLLEN: ICD-10-CM

## 2024-09-25 DIAGNOSIS — R06.02 SOB (SHORTNESS OF BREATH): Primary | ICD-10-CM

## 2024-09-25 DIAGNOSIS — R09.82 POSTNASAL DRIP: ICD-10-CM

## 2024-09-25 DIAGNOSIS — J45.40 MODERATE PERSISTENT ASTHMA WITHOUT COMPLICATION: Primary | ICD-10-CM

## 2024-09-25 LAB
FEF25-27, POC: 1.36 L/S
FET, POC: NORMAL
FEV 1 , POC: 1.69 L
FEV1/FVC, POC: NORMAL
FVC, POC: NORMAL
LUNG AGE, POC: NORMAL
PEF, POC: 6.21 L/S

## 2024-09-25 PROCEDURE — 94010 BREATHING CAPACITY TEST: CPT | Performed by: INTERNAL MEDICINE

## 2024-09-25 PROCEDURE — 99214 OFFICE O/P EST MOD 30 MIN: CPT | Performed by: INTERNAL MEDICINE

## 2024-09-25 PROCEDURE — 94726 PLETHYSMOGRAPHY LUNG VOLUMES: CPT | Performed by: INTERNAL MEDICINE

## 2024-09-25 PROCEDURE — 94729 DIFFUSING CAPACITY: CPT | Performed by: INTERNAL MEDICINE

## 2024-11-26 ENCOUNTER — OFFICE VISIT (OUTPATIENT)
Dept: FAMILY MEDICINE CLINIC | Facility: CLINIC | Age: 64
End: 2024-11-26
Payer: MEDICARE

## 2024-11-26 VITALS
HEIGHT: 66 IN | TEMPERATURE: 97.4 F | DIASTOLIC BLOOD PRESSURE: 60 MMHG | SYSTOLIC BLOOD PRESSURE: 110 MMHG | BODY MASS INDEX: 30.25 KG/M2 | WEIGHT: 188.2 LBS | OXYGEN SATURATION: 97 % | HEART RATE: 75 BPM

## 2024-11-26 DIAGNOSIS — J30.89 NON-SEASONAL ALLERGIC RHINITIS DUE TO OTHER ALLERGIC TRIGGER: ICD-10-CM

## 2024-11-26 DIAGNOSIS — E11.9 TYPE 2 DIABETES MELLITUS WITHOUT COMPLICATION, WITHOUT LONG-TERM CURRENT USE OF INSULIN (HCC): Primary | ICD-10-CM

## 2024-11-26 LAB — HBA1C MFR BLD: 6.2 %

## 2024-11-26 PROCEDURE — 1123F ACP DISCUSS/DSCN MKR DOCD: CPT | Performed by: FAMILY MEDICINE

## 2024-11-26 PROCEDURE — 3044F HG A1C LEVEL LT 7.0%: CPT | Performed by: FAMILY MEDICINE

## 2024-11-26 PROCEDURE — 99213 OFFICE O/P EST LOW 20 MIN: CPT | Performed by: FAMILY MEDICINE

## 2024-11-26 PROCEDURE — G8417 CALC BMI ABV UP PARAM F/U: HCPCS | Performed by: FAMILY MEDICINE

## 2024-11-26 PROCEDURE — 3017F COLORECTAL CA SCREEN DOC REV: CPT | Performed by: FAMILY MEDICINE

## 2024-11-26 PROCEDURE — 83036 HEMOGLOBIN GLYCOSYLATED A1C: CPT | Performed by: FAMILY MEDICINE

## 2024-11-26 PROCEDURE — 2022F DILAT RTA XM EVC RTNOPTHY: CPT | Performed by: FAMILY MEDICINE

## 2024-11-26 PROCEDURE — 1036F TOBACCO NON-USER: CPT | Performed by: FAMILY MEDICINE

## 2024-11-26 PROCEDURE — G8427 DOCREV CUR MEDS BY ELIG CLIN: HCPCS | Performed by: FAMILY MEDICINE

## 2024-11-26 PROCEDURE — G8484 FLU IMMUNIZE NO ADMIN: HCPCS | Performed by: FAMILY MEDICINE

## 2024-11-28 ASSESSMENT — ENCOUNTER SYMPTOMS
ABDOMINAL DISTENTION: 0
NAUSEA: 0
BLURRED VISION: 0
EYE PAIN: 0
ABDOMINAL PAIN: 0
COUGH: 0
SORE THROAT: 0
PHOTOPHOBIA: 0
SHORTNESS OF BREATH: 0
COLOR CHANGE: 0
BLOOD IN STOOL: 0

## 2024-11-28 NOTE — PROGRESS NOTES
Jose Daniel Osorio [facility]  1960 is a 64 y.o. male ,Established patient, here for evaluation of the following chief complaint(s):   Chief Complaint   Patient presents with    3 Month Follow-Up          1. Type 2 diabetes mellitus without complication, without long-term current use of insulin ([facility])  -     AMB POC HEMOGLOBIN A1C    Note that diet is reviewed and suggestions made    No follow-ups on file.        Subjective   SUBJECTIVE/OBJECTIVE:  He has brought in a list of sugars and foods.    Diabetes  He presents for his follow-up diabetic visit. He has type 2 diabetes mellitus. His disease course has been improving. Pertinent negatives for hypoglycemia include no confusion, headaches, pallor or speech difficulty. Pertinent negatives for diabetes include no blurred vision, no chest pain, no fatigue, no foot paresthesias, no polydipsia, no polyphagia and no weakness.       Review of Systems   Constitutional:  Negative for chills, fatigue and fever.   HENT:  Negative for ear pain, hearing loss and sore throat.    Eyes:  Negative for blurred vision, photophobia and pain.   Respiratory:  Negative for cough and shortness of breath.    Cardiovascular:  Negative for chest pain, palpitations and leg swelling.   Gastrointestinal:  Negative for abdominal distention, abdominal pain, blood in stool and nausea.   Endocrine: Negative for polydipsia and polyphagia.   Genitourinary:  Negative for dysuria and urgency.   Musculoskeletal:  Negative for joint swelling and myalgias.   Skin:  Negative for color change, pallor and rash.   Neurological:  Negative for speech difficulty, weakness, light-headedness and headaches.   Hematological:  Negative for adenopathy.   Psychiatric/Behavioral:  Negative for agitation, confusion, hallucinations, self-injury and suicidal ideas.           Objective   Physical Exam  Constitutional:       Appearance: Normal appearance.   HENT:      Head: Normocephalic and atraumatic.      Nose: Nose normal.

## 2025-01-06 DIAGNOSIS — D69.6 THROMBOCYTOPENIA (HCC): Primary | ICD-10-CM

## 2025-01-07 ENCOUNTER — OFFICE VISIT (OUTPATIENT)
Dept: ONCOLOGY | Age: 65
End: 2025-01-07
Payer: MEDICARE

## 2025-01-07 ENCOUNTER — HOSPITAL ENCOUNTER (OUTPATIENT)
Dept: LAB | Age: 65
Discharge: HOME | End: 2025-01-07
Payer: MEDICARE

## 2025-01-07 VITALS
OXYGEN SATURATION: 97 % | HEART RATE: 62 BPM | HEIGHT: 66 IN | TEMPERATURE: 97.3 F | WEIGHT: 192 LBS | BODY MASS INDEX: 30.86 KG/M2 | SYSTOLIC BLOOD PRESSURE: 144 MMHG | RESPIRATION RATE: 18 BRPM | DIASTOLIC BLOOD PRESSURE: 71 MMHG

## 2025-01-07 DIAGNOSIS — D69.6 THROMBOCYTOPENIA (HCC): ICD-10-CM

## 2025-01-07 DIAGNOSIS — D69.6 THROMBOCYTOPENIA (HCC): Primary | ICD-10-CM

## 2025-01-07 DIAGNOSIS — N40.0 BENIGN PROSTATIC HYPERPLASIA WITHOUT LOWER URINARY TRACT SYMPTOMS: ICD-10-CM

## 2025-01-07 LAB
ALBUMIN SERPL-MCNC: 3.8 G/DL (ref 3.2–4.6)
ALBUMIN/GLOB SERPL: 1.3 (ref 1–1.9)
ALP SERPL-CCNC: 141 U/L (ref 40–129)
ALT SERPL-CCNC: 36 U/L (ref 8–55)
ANION GAP SERPL CALC-SCNC: 10 MMOL/L (ref 7–16)
AST SERPL-CCNC: 24 U/L (ref 15–37)
BASOPHILS # BLD: 0.03 K/UL (ref 0–0.2)
BASOPHILS NFR BLD: 0.5 % (ref 0–2)
BILIRUB SERPL-MCNC: 1 MG/DL (ref 0–1.2)
BUN SERPL-MCNC: 22 MG/DL (ref 8–23)
CALCIUM SERPL-MCNC: 9.7 MG/DL (ref 8.8–10.2)
CHLORIDE SERPL-SCNC: 107 MMOL/L (ref 98–107)
CO2 SERPL-SCNC: 25 MMOL/L (ref 20–29)
CREAT SERPL-MCNC: 1.08 MG/DL (ref 0.8–1.3)
DIFFERENTIAL METHOD BLD: ABNORMAL
EOSINOPHIL # BLD: 0.07 K/UL (ref 0–0.8)
EOSINOPHIL NFR BLD: 1.1 % (ref 0.5–7.8)
ERYTHROCYTE [DISTWIDTH] IN BLOOD BY AUTOMATED COUNT: 12.8 % (ref 11.9–14.6)
GLOBULIN SER CALC-MCNC: 3 G/DL (ref 2.3–3.5)
GLUCOSE SERPL-MCNC: 113 MG/DL (ref 70–99)
HCT VFR BLD AUTO: 47.2 % (ref 41.1–50.3)
HGB BLD-MCNC: 15.9 G/DL (ref 13.6–17.2)
IMM GRANULOCYTES # BLD AUTO: 0.02 K/UL (ref 0–0.5)
IMM GRANULOCYTES NFR BLD AUTO: 0.3 % (ref 0–5)
LYMPHOCYTES # BLD: 1.59 K/UL (ref 0.5–4.6)
LYMPHOCYTES NFR BLD: 24.9 % (ref 13–44)
MCH RBC QN AUTO: 29.8 PG (ref 26.1–32.9)
MCHC RBC AUTO-ENTMCNC: 33.7 G/DL (ref 31.4–35)
MCV RBC AUTO: 88.4 FL (ref 82–102)
MONOCYTES # BLD: 0.51 K/UL (ref 0.1–1.3)
MONOCYTES NFR BLD: 8 % (ref 4–12)
NEUTS SEG # BLD: 4.16 K/UL (ref 1.7–8.2)
NEUTS SEG NFR BLD: 65.2 % (ref 43–78)
NRBC # BLD: 0 K/UL (ref 0–0.2)
PLATELET # BLD AUTO: 147 K/UL (ref 150–450)
PMV BLD AUTO: 11.4 FL (ref 9.4–12.3)
POTASSIUM SERPL-SCNC: 4.4 MMOL/L (ref 3.5–5.1)
PROT SERPL-MCNC: 6.7 G/DL (ref 6.3–8.2)
RBC # BLD AUTO: 5.34 M/UL (ref 4.23–5.6)
SODIUM SERPL-SCNC: 142 MMOL/L (ref 136–145)
WBC # BLD AUTO: 6.4 K/UL (ref 4.3–11.1)

## 2025-01-07 PROCEDURE — 1123F ACP DISCUSS/DSCN MKR DOCD: CPT | Performed by: INTERNAL MEDICINE

## 2025-01-07 PROCEDURE — 1036F TOBACCO NON-USER: CPT | Performed by: INTERNAL MEDICINE

## 2025-01-07 PROCEDURE — 36415 COLL VENOUS BLD VENIPUNCTURE: CPT

## 2025-01-07 PROCEDURE — G8427 DOCREV CUR MEDS BY ELIG CLIN: HCPCS | Performed by: INTERNAL MEDICINE

## 2025-01-07 PROCEDURE — 99214 OFFICE O/P EST MOD 30 MIN: CPT | Performed by: INTERNAL MEDICINE

## 2025-01-07 PROCEDURE — 80053 COMPREHEN METABOLIC PANEL: CPT

## 2025-01-07 PROCEDURE — G8417 CALC BMI ABV UP PARAM F/U: HCPCS | Performed by: INTERNAL MEDICINE

## 2025-01-07 PROCEDURE — 3017F COLORECTAL CA SCREEN DOC REV: CPT | Performed by: INTERNAL MEDICINE

## 2025-01-07 PROCEDURE — M1308 PR FLU IMMUNIZE NO ADMIN: HCPCS | Performed by: INTERNAL MEDICINE

## 2025-01-07 PROCEDURE — 85025 COMPLETE CBC W/AUTO DIFF WBC: CPT

## 2025-01-07 NOTE — PROGRESS NOTES
[address] [address]  Phone: [phone]           1/7/2025  Jose Daniel Osorio III  1960  [id number]        Jose Daniel Osorio III is a 64 year old  man who has returned to my clinic for a follow-up visit; he was initially referred to me by Dr. Zia Patrick for evaluation of Thrombocytopenia, he probably has ITP.        ALLERGIES:    No known drug allergies.        FAMILY HISTORY:    No hematologic disorders.        SOCIAL HISTORY:   He is  and lives with his wife. He used to work as a . He denies ever using any tobacco products.        PAST MEDICAL HISTORY:    Diabetes Mellitus, BPH, Coronary Artery Disease and Asthma.        ROS:  The patient complained of fatigue; all other systems negative.        PHYSICAL EXAM:   The patient was alert, awake and oriented, no acute distress was noted. Oral examination did not reveal any mucosal lesions. Lymph node examination did not reveal any adenopathy. Neck examination revealed a supple neck, no thyromegaly or masses were noted. Chest examination revealed normal vesicular breath sounds. Heart examination revealed S-1 and S-2 without any murmurs. Abdominal examination revealed a non-tender abdomen, bowel sounds were positive, no organomegaly could be appreciated. Examination of the extremities did not reveal any tenderness or erythema. Examination of the skin did not reveal any lesions.        KPS:   100.        LABORATORY INVESTIGATIONS:  CBC showed a WBC count of 6.4, ANC was 4.1, Hemoglobin was 15.9 and Platelets were 147. Medical problems and test results were reviewed with the patient today.        ASSESSMENT:    Thrombocytopenia; BPH. I spent a total of 32 minutes on the day of the visit, managing the care of this patient.        PLAN:   He should continue taking Finasteride; at his next clinic visit I will re-check his CBC and CMP.        Aftab Baeza MD  Hematology/BMT

## 2025-01-07 NOTE — PATIENT INSTRUCTIONS
Patient Information from Today's Visit    The members of your [facility] are listed below:    Physician Provider: Aftab Baeza Medical Oncologist  Advanced Practice Clinician: Nelida Fierro NP  Registered Nurse: N/A  Navigator: N/A  Medical Assistant: Dayanna GARCIA MA and Noreen TAVERAS MA  : Kaylan FRIAS   Supportive Care Services: Everardo BORGES LMSW    Diagnosis: Thrombocytopenia      Follow Up Instructions:   Follow up January 2026    Treatment Summary has been discussed and given to patient:N/A      Current Labs:   Hospital Outpatient Visit on 01/07/2025   Component Date Value Ref Range Status    Sodium 01/07/2025 142  136 - 145 mmol/L Final    Potassium 01/07/2025 4.4  3.5 - 5.1 mmol/L Final    Chloride 01/07/2025 107  98 - 107 mmol/L Final    CO2 01/07/2025 25  20 - 29 mmol/L Final    Anion Gap 01/07/2025 10  7 - 16 mmol/L Final    Glucose 01/07/2025 113 (H)  70 - 99 mg/dL Final    Comment: <70 mg/dL Consistent with, but not fully diagnostic of hypoglycemia.  100 - 125 mg/dL Impaired fasting glucose/consistent with pre-diabetes mellitus.  > 126 mg/dl Fasting glucose consistent with overt diabetes mellitus      BUN 01/07/2025 22  8 - 23 MG/DL Final    Creatinine 01/07/2025 1.08  0.80 - 1.30 MG/DL Final    Est, Glom Filt Rate 01/07/2025 76  >60 ml/min/1.73m2 Final    Comment:    Pediatric calculator link: https://www.kidney.org/professionals/kdoqi/gfr_calculatorped     These results are not intended for use in patients <18 years of age.     eGFR results are calculated without a race factor using  the 2021 CKD-EPI equation. Careful clinical correlation is recommended, particularly when comparing to results calculated using previous equations.  The CKD-EPI equation is less accurate in patients with extremes of muscle mass, extra-renal metabolism of creatinine, excessive creatine ingestion, or following therapy that affects renal tubular secretion.      Calcium 01/07/2025 9.7  8.8 - 10.2 MG/DL Final

## 2025-01-13 ENCOUNTER — TELEPHONE (OUTPATIENT)
Dept: UROLOGY | Age: 65
End: 2025-01-13

## 2025-01-13 NOTE — TELEPHONE ENCOUNTER
Patient called and stated that his silodosin is not covered under his insurance and he would like a call back to discuss the other options for medication with someone.

## 2025-03-10 NOTE — TELEPHONE ENCOUNTER
Patients states insurance isn't going to pay for the medicine (SILODOSIN) that he is currently on that was prescribe by Dr. Ellsworth because he switched insurance companies. Patient states his new insurance told him they will pay for the following medications: Terazosin, Pamsulosis, Doxazosin.    Pt stated that his current insurance stated that if he has to take the following medication SILODOSIN that the doctor or his clinical nurse must call the following number [phone] to get this situated. Patient would like a call back from clinical with an update asap.

## 2025-03-24 RX ORDER — DOXAZOSIN 4 MG/1
4 TABLET ORAL NIGHTLY
Qty: 30 TABLET | Refills: 11 | Status: SHIPPED | OUTPATIENT
Start: 2025-03-24

## 2025-03-24 NOTE — TELEPHONE ENCOUNTER
Pt calling again regarding PA. I was told this has been handled. Pt now would like to try one of medications listed in message from 3/10.

## 2025-04-07 ENCOUNTER — LAB (OUTPATIENT)
Dept: UROLOGY | Age: 65
End: 2025-04-07

## 2025-04-07 DIAGNOSIS — N13.8 BENIGN PROSTATIC HYPERPLASIA WITH URINARY OBSTRUCTION: ICD-10-CM

## 2025-04-07 DIAGNOSIS — N40.1 BENIGN PROSTATIC HYPERPLASIA WITH URINARY OBSTRUCTION: ICD-10-CM

## 2025-04-07 LAB — PSA SERPL-MCNC: 2.3 NG/ML (ref 0–4)

## 2025-04-14 ENCOUNTER — OFFICE VISIT (OUTPATIENT)
Dept: UROLOGY | Age: 65
End: 2025-04-14
Payer: MEDICARE

## 2025-04-14 DIAGNOSIS — N40.1 BENIGN PROSTATIC HYPERPLASIA WITH URINARY OBSTRUCTION: Primary | ICD-10-CM

## 2025-04-14 DIAGNOSIS — N13.8 BENIGN PROSTATIC HYPERPLASIA WITH URINARY OBSTRUCTION: Primary | ICD-10-CM

## 2025-04-14 LAB
BILIRUBIN, URINE, POC: NEGATIVE
BLOOD URINE, POC: NEGATIVE
GLUCOSE URINE, POC: NEGATIVE MG/DL
KETONES, URINE, POC: NEGATIVE MG/DL
LEUKOCYTE ESTERASE, URINE, POC: NEGATIVE
NITRITE, URINE, POC: NEGATIVE
PH, URINE, POC: 6 (ref 4.6–8)
PROTEIN,URINE, POC: NORMAL MG/DL
SPECIFIC GRAVITY, URINE, POC: 1.03 (ref 1–1.03)
URINALYSIS CLARITY, POC: NORMAL
URINALYSIS COLOR, POC: NORMAL
UROBILINOGEN, POC: NORMAL MG/DL

## 2025-04-14 PROCEDURE — 3017F COLORECTAL CA SCREEN DOC REV: CPT | Performed by: UROLOGY

## 2025-04-14 PROCEDURE — 81003 URINALYSIS AUTO W/O SCOPE: CPT | Performed by: UROLOGY

## 2025-04-14 PROCEDURE — G8427 DOCREV CUR MEDS BY ELIG CLIN: HCPCS | Performed by: UROLOGY

## 2025-04-14 PROCEDURE — G8417 CALC BMI ABV UP PARAM F/U: HCPCS | Performed by: UROLOGY

## 2025-04-14 PROCEDURE — 1036F TOBACCO NON-USER: CPT | Performed by: UROLOGY

## 2025-04-14 PROCEDURE — 1123F ACP DISCUSS/DSCN MKR DOCD: CPT | Performed by: UROLOGY

## 2025-04-14 PROCEDURE — 99213 OFFICE O/P EST LOW 20 MIN: CPT | Performed by: UROLOGY

## 2025-04-14 RX ORDER — DOXAZOSIN 4 MG/1
4 TABLET ORAL NIGHTLY
Qty: 90 TABLET | Refills: 3 | Status: SHIPPED | OUTPATIENT
Start: 2025-04-14

## 2025-04-14 RX ORDER — FINASTERIDE 5 MG/1
TABLET, FILM COATED ORAL
Qty: 90 TABLET | Refills: 3 | Status: SHIPPED | OUTPATIENT
Start: 2025-04-14

## 2025-04-14 ASSESSMENT — ENCOUNTER SYMPTOMS: BACK PAIN: 0

## 2025-04-14 NOTE — PROGRESS NOTES
[facility]  [address] [place] [id number]  [phone]          Jose Daniel Osorio III  : 1960    Chief Complaint   Patient presents with    Follow-up     yearly          HPI     Jose Daniel Osorio III is a 64 y.o. male referred by Dr. Patrick in  in regards to BPH/LUTS.  His chief complaint was slow stream.  It had gradually been worsening over 2 years.  He has no nocturia.  He has no family history of ACP.       He was started on tamsulosin in .  In , it was switched to rapaflo and finasteride was started as well.  LUTS have improved. He is NOW on cardura 4 mg and finasteride 5 mg due to insurance coverage.  Still occasional low volume (no pad) UUI/dribbling.     PSA: 10/8/20 2.7,  3.3, 3/23 2.5,  3.1,  2.3          Past Medical History:   Diagnosis Date    Asthma 3/15/23    Bilateral inguinal hernia     BMI 29.0-29.9,adult     BMI 29.1    Erectile dysfunction     Obesity Year ago    None     Past Surgical History:   Procedure Laterality Date    APPENDECTOMY      HERNIA REPAIR  2016    [facility]    ORTHOPEDIC SURGERY Right     RIGHT FOOT    ORTHOPEDIC SURGERY Bilateral     Thumbs    ORTHOPEDIC SURGERY      Both hands, Right foot    MS APPENDECTOMY       Current Outpatient Medications   Medication Sig Dispense Refill    doxazosin (CARDURA) 4 MG tablet Take 1 tablet by mouth nightly 90 tablet 3    finasteride (PROSCAR) 5 MG tablet Take 1 tablet by mouth daily 90 tablet 3    metFORMIN (GLUCOPHAGE-XR) 500 MG extended release tablet Take 1 tablet by mouth daily (with breakfast) 30 tablet 5    silodosin (RAPAFLO) 4 MG CAPS capsule Take one capsule by mouth once daily 90 capsule 3    BREO ELLIPTA 200-25 MCG/ACT AEPB inhaler INHALE 1 PUFF INTO THE LUNGS DAILY 3 each 4    albuterol sulfate HFA (PROVENTIL;VENTOLIN;PROAIR) 108 (90 Base) MCG/ACT inhaler Inhale 2 puffs into the lungs every 6 hours as needed for Wheezing 1 each 11     No current facility-administered

## 2025-04-18 ENCOUNTER — TELEPHONE (OUTPATIENT)
Dept: UROLOGY | Age: 65
End: 2025-04-18

## 2025-04-18 RX ORDER — TAMSULOSIN HYDROCHLORIDE 0.4 MG/1
0.4 CAPSULE ORAL EVERY EVENING
Qty: 30 CAPSULE | Refills: 11 | Status: SHIPPED | OUTPATIENT
Start: 2025-04-18

## 2025-04-23 ENCOUNTER — OFFICE VISIT (OUTPATIENT)
Dept: PULMONOLOGY | Age: 65
End: 2025-04-23
Payer: MEDICARE

## 2025-04-23 VITALS
OXYGEN SATURATION: 91 % | TEMPERATURE: 97.5 F | RESPIRATION RATE: 16 BRPM | SYSTOLIC BLOOD PRESSURE: 131 MMHG | HEIGHT: 66 IN | HEART RATE: 62 BPM | WEIGHT: 191.8 LBS | BODY MASS INDEX: 30.82 KG/M2 | DIASTOLIC BLOOD PRESSURE: 75 MMHG

## 2025-04-23 DIAGNOSIS — J45.40 MODERATE PERSISTENT ASTHMA WITHOUT COMPLICATION: ICD-10-CM

## 2025-04-23 DIAGNOSIS — J30.1 SEASONAL ALLERGIC RHINITIS DUE TO POLLEN: ICD-10-CM

## 2025-04-23 DIAGNOSIS — J44.9 OBSTRUCTIVE LUNG DISEASE (GENERALIZED) (HCC): ICD-10-CM

## 2025-04-23 DIAGNOSIS — R06.02 SOB (SHORTNESS OF BREATH): Primary | ICD-10-CM

## 2025-04-23 PROCEDURE — 3023F SPIROM DOC REV: CPT | Performed by: INTERNAL MEDICINE

## 2025-04-23 PROCEDURE — 1123F ACP DISCUSS/DSCN MKR DOCD: CPT | Performed by: INTERNAL MEDICINE

## 2025-04-23 PROCEDURE — G8427 DOCREV CUR MEDS BY ELIG CLIN: HCPCS | Performed by: INTERNAL MEDICINE

## 2025-04-23 PROCEDURE — 99214 OFFICE O/P EST MOD 30 MIN: CPT | Performed by: INTERNAL MEDICINE

## 2025-04-23 PROCEDURE — 3017F COLORECTAL CA SCREEN DOC REV: CPT | Performed by: INTERNAL MEDICINE

## 2025-04-23 PROCEDURE — 1036F TOBACCO NON-USER: CPT | Performed by: INTERNAL MEDICINE

## 2025-04-23 PROCEDURE — G8417 CALC BMI ABV UP PARAM F/U: HCPCS | Performed by: INTERNAL MEDICINE

## 2025-04-23 RX ORDER — ALBUTEROL SULFATE 90 UG/1
2 INHALANT RESPIRATORY (INHALATION) EVERY 6 HOURS PRN
Qty: 1 EACH | Refills: 11 | Status: SHIPPED | OUTPATIENT
Start: 2025-04-23

## 2025-04-23 RX ORDER — FLUTICASONE FUROATE AND VILANTEROL 200; 25 UG/1; UG/1
1 POWDER RESPIRATORY (INHALATION) DAILY
Qty: 3 EACH | Refills: 5 | Status: SHIPPED | OUTPATIENT
Start: 2025-04-23

## 2025-04-23 NOTE — PROGRESS NOTES
pain.    COMPARISON: July 19, 2018.    TECHNIQUE: Axial 2.5 mm images were obtained from the skull base to the apex of  the calvarium without the use of intravenous contrast.  Coronal and sagittal  reconstructions were then performed.  Dose optimization technique was utilized.    FINDINGS:  The brain parenchyma is normal.    The brain volume is normal for age.    There is no intracranial hemorrhage, extraaxial collection, mass effect or  hydrocephalus.  The calvarium is intact.   No fluid is seen in the paranasal  sinuses or mastoid air cells.    Impression  No acute intracranial process.            ** If there are any questions about this report, I can be reached on  [employer] or at [phone] **    Nuclear Medicine: No results found for this or any previous visit from the past 3650 days.      PFTs:       Latest Ref Rng & Units 8/26/2022    12:00 AM   Office Spirometry Results   FVC L 2.60    FEV1 L 1.93    FEV1 %Pred-Pre % 64    FVC %Pred-Pre % 64    FEV1/FVC % 74      Results for orders placed or performed in visit on 02/20/23   AMB POC SPIROMETRY W/BRONCHODILATOR    Collection Time: 02/20/23 12:00 AM   Result Value Ref Range    FEV 1 , POC 1.32 L    FEV1 % Pred POC 41 %    FVC, POC 1.92 L     FVC % Pred POC 47 %    FEV1/FVC, POC 69%     No results found for this or any previous visit.        FeNO: No results found for this or any previous visit.  FeNO and Likelihood of Eosinophilic Asthma   Unlikely Intermediate Likely   <25 ppb 25-50 ppb >50ppb     Exercise Oximetry:    Echo:   TRANSTHORACIC ECHOCARDIOGRAM (TTE) COMPLETE (CONTRAST/BUBBLE/3D PRN) 05/12/2022    Narrative  This is a summary report. The complete report is available in the patient's medical record. If you cannot access the medical record, please contact the sending organization for a detailed fax or copy.      Left Ventricle: Normal left ventricular systolic function with a visually estimated EF of 60 - 65%. Left ventricle size is normal. Mildly

## 2025-05-15 ENCOUNTER — TELEPHONE (OUTPATIENT)
Dept: UROLOGY | Age: 65
End: 2025-05-15

## 2025-05-22 SDOH — HEALTH STABILITY: PHYSICAL HEALTH: ON AVERAGE, HOW MANY DAYS PER WEEK DO YOU ENGAGE IN MODERATE TO STRENUOUS EXERCISE (LIKE A BRISK WALK)?: 0 DAYS

## 2025-05-22 ASSESSMENT — LIFESTYLE VARIABLES
HOW OFTEN DO YOU HAVE A DRINK CONTAINING ALCOHOL: 1
HOW OFTEN DO YOU HAVE A DRINK CONTAINING ALCOHOL: NEVER
HOW MANY STANDARD DRINKS CONTAINING ALCOHOL DO YOU HAVE ON A TYPICAL DAY: PATIENT DOES NOT DRINK
HOW MANY STANDARD DRINKS CONTAINING ALCOHOL DO YOU HAVE ON A TYPICAL DAY: 0
HOW OFTEN DO YOU HAVE SIX OR MORE DRINKS ON ONE OCCASION: 1

## 2025-05-22 ASSESSMENT — PATIENT HEALTH QUESTIONNAIRE - PHQ9
2. FEELING DOWN, DEPRESSED OR HOPELESS: NOT AT ALL
1. LITTLE INTEREST OR PLEASURE IN DOING THINGS: NOT AT ALL
SUM OF ALL RESPONSES TO PHQ QUESTIONS 1-9: 0

## 2025-05-23 ENCOUNTER — OFFICE VISIT (OUTPATIENT)
Dept: FAMILY MEDICINE CLINIC | Facility: CLINIC | Age: 65
End: 2025-05-23

## 2025-05-23 ENCOUNTER — HOSPITAL ENCOUNTER (OUTPATIENT)
Dept: GENERAL RADIOLOGY | Age: 65
Discharge: HOME | End: 2025-05-26
Payer: MEDICARE

## 2025-05-23 VITALS
TEMPERATURE: 97.6 F | OXYGEN SATURATION: 95 % | HEIGHT: 66 IN | HEART RATE: 58 BPM | SYSTOLIC BLOOD PRESSURE: 120 MMHG | DIASTOLIC BLOOD PRESSURE: 70 MMHG | BODY MASS INDEX: 30.67 KG/M2 | WEIGHT: 190.8 LBS

## 2025-05-23 DIAGNOSIS — M25.511 ACUTE PAIN OF RIGHT SHOULDER: ICD-10-CM

## 2025-05-23 DIAGNOSIS — N40.1 BENIGN PROSTATIC HYPERPLASIA WITH WEAK URINARY STREAM: ICD-10-CM

## 2025-05-23 DIAGNOSIS — Z00.00 WELCOME TO MEDICARE PREVENTIVE VISIT: ICD-10-CM

## 2025-05-23 DIAGNOSIS — E78.2 MIXED HYPERLIPIDEMIA: ICD-10-CM

## 2025-05-23 DIAGNOSIS — R39.12 BENIGN PROSTATIC HYPERPLASIA WITH WEAK URINARY STREAM: ICD-10-CM

## 2025-05-23 DIAGNOSIS — E11.9 TYPE 2 DIABETES MELLITUS WITHOUT COMPLICATION, WITHOUT LONG-TERM CURRENT USE OF INSULIN (HCC): ICD-10-CM

## 2025-05-23 DIAGNOSIS — M79.621 PAIN OF RIGHT UPPER ARM: ICD-10-CM

## 2025-05-23 DIAGNOSIS — J45.40 MODERATE PERSISTENT ASTHMA, UNSPECIFIED WHETHER COMPLICATED: ICD-10-CM

## 2025-05-23 DIAGNOSIS — Z00.00 MEDICARE ANNUAL WELLNESS VISIT, SUBSEQUENT: Primary | ICD-10-CM

## 2025-05-23 LAB
ALBUMIN SERPL-MCNC: 3.9 G/DL (ref 3.2–4.6)
ALBUMIN/GLOB SERPL: 1.6 (ref 1–1.9)
ALP SERPL-CCNC: 115 U/L (ref 40–129)
ALT SERPL-CCNC: 48 U/L (ref 8–55)
ANION GAP SERPL CALC-SCNC: 10 MMOL/L (ref 7–16)
AST SERPL-CCNC: 25 U/L (ref 15–37)
BASOPHILS # BLD: 0.04 K/UL (ref 0–0.2)
BASOPHILS NFR BLD: 0.6 % (ref 0–2)
BILIRUB SERPL-MCNC: 1.6 MG/DL (ref 0–1.2)
BUN SERPL-MCNC: 20 MG/DL (ref 8–23)
CALCIUM SERPL-MCNC: 9.3 MG/DL (ref 8.8–10.2)
CHLORIDE SERPL-SCNC: 107 MMOL/L (ref 98–107)
CHOLEST SERPL-MCNC: 167 MG/DL (ref 0–200)
CO2 SERPL-SCNC: 25 MMOL/L (ref 20–29)
CREAT SERPL-MCNC: 1.03 MG/DL (ref 0.8–1.3)
CREAT UR-MCNC: 145 MG/DL (ref 39–259)
DIFFERENTIAL METHOD BLD: ABNORMAL
EOSINOPHIL # BLD: 0.03 K/UL (ref 0–0.8)
EOSINOPHIL NFR BLD: 0.4 % (ref 0.5–7.8)
ERYTHROCYTE [DISTWIDTH] IN BLOOD BY AUTOMATED COUNT: 12.7 % (ref 11.9–14.6)
EST. AVERAGE GLUCOSE BLD GHB EST-MCNC: 129 MG/DL
GLOBULIN SER CALC-MCNC: 2.5 G/DL (ref 2.3–3.5)
GLUCOSE SERPL-MCNC: 114 MG/DL (ref 70–99)
HBA1C MFR BLD: 6.1 % (ref 0–5.6)
HCT VFR BLD AUTO: 48.2 % (ref 41.1–50.3)
HDLC SERPL-MCNC: 82 MG/DL (ref 40–60)
HDLC SERPL: 2 (ref 0–5)
HGB BLD-MCNC: 16 G/DL (ref 13.6–17.2)
IMM GRANULOCYTES # BLD AUTO: 0.02 K/UL (ref 0–0.5)
IMM GRANULOCYTES NFR BLD AUTO: 0.3 % (ref 0–5)
LDLC SERPL CALC-MCNC: 72 MG/DL (ref 0–100)
LYMPHOCYTES # BLD: 1.53 K/UL (ref 0.5–4.6)
LYMPHOCYTES NFR BLD: 22.9 % (ref 13–44)
MCH RBC QN AUTO: 29.9 PG (ref 26.1–32.9)
MCHC RBC AUTO-ENTMCNC: 33.2 G/DL (ref 31.4–35)
MCV RBC AUTO: 89.9 FL (ref 82–102)
MICROALBUMIN UR-MCNC: 1.46 MG/DL (ref 0–20)
MICROALBUMIN/CREAT UR-RTO: 10 MG/G (ref 0–30)
MONOCYTES # BLD: 0.41 K/UL (ref 0.1–1.3)
MONOCYTES NFR BLD: 6.1 % (ref 4–12)
NEUTS SEG # BLD: 4.65 K/UL (ref 1.7–8.2)
NEUTS SEG NFR BLD: 69.7 % (ref 43–78)
NRBC # BLD: 0 K/UL (ref 0–0.2)
PLATELET # BLD AUTO: 137 K/UL (ref 150–450)
PMV BLD AUTO: 12.3 FL (ref 9.4–12.3)
POTASSIUM SERPL-SCNC: 4.2 MMOL/L (ref 3.5–5.1)
PROT SERPL-MCNC: 6.4 G/DL (ref 6.3–8.2)
RBC # BLD AUTO: 5.36 M/UL (ref 4.23–5.6)
SODIUM SERPL-SCNC: 142 MMOL/L (ref 136–145)
TRIGL SERPL-MCNC: 64 MG/DL (ref 0–150)
VLDLC SERPL CALC-MCNC: 13 MG/DL (ref 6–23)
WBC # BLD AUTO: 6.7 K/UL (ref 4.3–11.1)

## 2025-05-23 PROCEDURE — 73060 X-RAY EXAM OF HUMERUS: CPT

## 2025-05-23 PROCEDURE — 73030 X-RAY EXAM OF SHOULDER: CPT

## 2025-05-23 RX ORDER — MELOXICAM 7.5 MG/1
7.5 TABLET ORAL DAILY
Qty: 30 TABLET | Refills: 3 | Status: SHIPPED | OUTPATIENT
Start: 2025-05-23

## 2025-05-23 SDOH — ECONOMIC STABILITY: FOOD INSECURITY: WITHIN THE PAST 12 MONTHS, THE FOOD YOU BOUGHT JUST DIDN'T LAST AND YOU DIDN'T HAVE MONEY TO GET MORE.: NEVER TRUE

## 2025-05-23 SDOH — ECONOMIC STABILITY: FOOD INSECURITY: WITHIN THE PAST 12 MONTHS, YOU WORRIED THAT YOUR FOOD WOULD RUN OUT BEFORE YOU GOT MONEY TO BUY MORE.: NEVER TRUE

## 2025-05-23 NOTE — PROGRESS NOTES
mass index is 30.8 kg/m². (!) Abnormal  Interventions:  Patient declines any further evaluation or treatment        Dentist Screen:  Have you seen the dentist within the past year?: (!) (Proxy-Rptd) No    Intervention:  Advised to schedule with their dentist      Safety:  Do you have non-slip mats or non-slip surfaces or shower bars or grab bars in your shower or bathtub?: (!) (Proxy-Rptd) No  Interventions:  Patient advised to follow up in the office for further evaluation and treatment     Advanced Directives:  Do you have a Living Will?: (!) (Proxy-Rptd) No    Intervention:  has NO advanced directive - not interested in additional information                     Objective   Vitals:    05/23/25 0949   BP: 120/70   BP Site: Left Upper Arm   Patient Position: Sitting   BP Cuff Size: Medium Adult   Pulse: 58   Temp: 97.6 °F (36.4 °C)   TempSrc: Skin   SpO2: 95%   Weight: 86.5 kg (190 lb 12.8 oz)   Height: 1.676 m (5' 6\")      Body mass index is 30.8 kg/m².                  No Known Allergies  Prior to Visit Medications    Medication Sig Taking? Authorizing Provider   fluticasone furoate-vilanterol (BREO ELLIPTA) 200-25 MCG/ACT AEPB inhaler Inhale 1 puff into the lungs daily  Corby Marcum MD   albuterol sulfate HFA (PROVENTIL;VENTOLIN;PROAIR) 108 (90 Base) MCG/ACT inhaler Inhale 2 puffs into the lungs every 6 hours as needed for Wheezing  Corby Marcum MD   tamsulosin (FLOMAX) 0.4 MG capsule Take 1 capsule by mouth every evening  Irving Ellsworth MD   finasteride (PROSCAR) 5 MG tablet Take 1 tablet by mouth daily  Irving Ellsworth MD   metFORMIN (GLUCOPHAGE-XR) 500 MG extended release tablet Take 1 tablet by mouth daily (with breakfast)  Patient not taking: Reported on 4/23/2025  Zia Patrick MD   silodosin (RAPAFLO) 4 MG CAPS capsule Take one capsule by mouth once daily  Patient not taking: Reported on 4/23/2025  Irving Ellsworth MD       [facility] (Including outside providers/suppliers

## 2025-05-23 NOTE — PATIENT INSTRUCTIONS
having heart disease. A heart-healthy lifestyle can help keep your heart healthy and prevent heart disease. This lifestyle includes eating healthy, being active, staying at a weight that's healthy for you, and not smoking or using tobacco. It also includes taking medicines as directed, managing other health conditions, and trying to get a healthy amount of sleep.  Follow-up care is a key part of your treatment and safety. Be sure to make and go to all appointments, and call your doctor if you are having problems. It's also a good idea to know your test results and keep a list of the medicines you take.  How can you care for yourself at home?  Diet    Use less salt when you cook and eat. This helps lower your blood pressure. Taste food before salting. Add only a little salt when you think you need it. With time, your taste buds will adjust to less salt.     Eat fewer snack items, fast foods, canned soups, and other high-salt, high-fat, processed foods.     Read food labels and try to avoid saturated and trans fats. They increase your risk of heart disease by raising cholesterol levels.     Limit the amount of solid fat--butter, margarine, and shortening--you eat. Use olive, peanut, or canola oil when you cook. Bake, broil, and steam foods instead of frying them.     Eat a variety of fruit and vegetables every day. Dark green, deep orange, red, or yellow fruits and vegetables are especially good for you. Examples include spinach, carrots, peaches, and berries.     Foods high in fiber can reduce your cholesterol and provide important vitamins and minerals. High-fiber foods include whole-grain cereals and breads, oatmeal, beans, brown rice, citrus fruits, and apples.     Eat lean proteins. Heart-healthy proteins include seafood, lean meats and poultry, eggs, beans, peas, nuts, seeds, and soy products.     Limit drinks and foods with added sugar. These include candy, desserts, and soda pop.   Heart-healthy lifestyle

## 2025-05-24 ASSESSMENT — ENCOUNTER SYMPTOMS
ABDOMINAL DISTENTION: 0
DIFFICULTY BREATHING: 0
PHOTOPHOBIA: 0
COUGH: 0
BLOOD IN STOOL: 0
SORE THROAT: 0
CHEST TIGHTNESS: 0
COLOR CHANGE: 0
ABDOMINAL PAIN: 0
SHORTNESS OF BREATH: 0
NAUSEA: 0
EYE PAIN: 0

## 2025-05-26 ENCOUNTER — RESULTS FOLLOW-UP (OUTPATIENT)
Dept: FAMILY MEDICINE CLINIC | Facility: CLINIC | Age: 65
End: 2025-05-26

## 2025-05-26 DIAGNOSIS — D69.6 THROMBOCYTOPENIA: Primary | ICD-10-CM

## 2025-06-09 ENCOUNTER — OFFICE VISIT (OUTPATIENT)
Dept: FAMILY MEDICINE CLINIC | Facility: CLINIC | Age: 65
End: 2025-06-09
Payer: MEDICARE

## 2025-06-09 VITALS
WEIGHT: 188.8 LBS | HEIGHT: 66 IN | BODY MASS INDEX: 30.34 KG/M2 | SYSTOLIC BLOOD PRESSURE: 110 MMHG | TEMPERATURE: 98.3 F | DIASTOLIC BLOOD PRESSURE: 64 MMHG | HEART RATE: 67 BPM | OXYGEN SATURATION: 97 %

## 2025-06-09 DIAGNOSIS — D69.6 THROMBOCYTOPENIA: ICD-10-CM

## 2025-06-09 DIAGNOSIS — L08.9 SKIN INFECTION: Primary | ICD-10-CM

## 2025-06-09 LAB
BASOPHILS # BLD: 0.03 K/UL (ref 0–0.2)
BASOPHILS NFR BLD: 0.5 % (ref 0–2)
DIFFERENTIAL METHOD BLD: ABNORMAL
EOSINOPHIL # BLD: 0.03 K/UL (ref 0–0.8)
EOSINOPHIL NFR BLD: 0.5 % (ref 0.5–7.8)
ERYTHROCYTE [DISTWIDTH] IN BLOOD BY AUTOMATED COUNT: 12.9 % (ref 11.9–14.6)
HCT VFR BLD AUTO: 48.2 % (ref 41.1–50.3)
HGB BLD-MCNC: 16.3 G/DL (ref 13.6–17.2)
IMM GRANULOCYTES # BLD AUTO: 0.02 K/UL (ref 0–0.5)
IMM GRANULOCYTES NFR BLD AUTO: 0.3 % (ref 0–5)
LYMPHOCYTES # BLD: 1.46 K/UL (ref 0.5–4.6)
LYMPHOCYTES NFR BLD: 22.7 % (ref 13–44)
MCH RBC QN AUTO: 30 PG (ref 26.1–32.9)
MCHC RBC AUTO-ENTMCNC: 33.8 G/DL (ref 31.4–35)
MCV RBC AUTO: 88.8 FL (ref 82–102)
MONOCYTES # BLD: 0.51 K/UL (ref 0.1–1.3)
MONOCYTES NFR BLD: 7.9 % (ref 4–12)
NEUTS SEG # BLD: 4.37 K/UL (ref 1.7–8.2)
NEUTS SEG NFR BLD: 68.1 % (ref 43–78)
NRBC # BLD: 0 K/UL (ref 0–0.2)
PLATELET # BLD AUTO: 142 K/UL (ref 150–450)
PMV BLD AUTO: 12.5 FL (ref 9.4–12.3)
RBC # BLD AUTO: 5.43 M/UL (ref 4.23–5.6)
WBC # BLD AUTO: 6.4 K/UL (ref 4.3–11.1)

## 2025-06-09 PROCEDURE — 99213 OFFICE O/P EST LOW 20 MIN: CPT | Performed by: FAMILY MEDICINE

## 2025-06-09 PROCEDURE — 1036F TOBACCO NON-USER: CPT | Performed by: FAMILY MEDICINE

## 2025-06-09 PROCEDURE — G8417 CALC BMI ABV UP PARAM F/U: HCPCS | Performed by: FAMILY MEDICINE

## 2025-06-09 PROCEDURE — 1123F ACP DISCUSS/DSCN MKR DOCD: CPT | Performed by: FAMILY MEDICINE

## 2025-06-09 PROCEDURE — 3017F COLORECTAL CA SCREEN DOC REV: CPT | Performed by: FAMILY MEDICINE

## 2025-06-09 PROCEDURE — G8427 DOCREV CUR MEDS BY ELIG CLIN: HCPCS | Performed by: FAMILY MEDICINE

## 2025-06-09 RX ORDER — MUPIROCIN 2 %
OINTMENT (GRAM) TOPICAL
Qty: 15 G | Refills: 3 | Status: SHIPPED | OUTPATIENT
Start: 2025-06-09 | End: 2025-06-16

## 2025-06-11 ASSESSMENT — ENCOUNTER SYMPTOMS
ABDOMINAL PAIN: 0
BLURRED VISION: 0
PHOTOPHOBIA: 0
BLOOD IN STOOL: 0
COLOR CHANGE: 0
SHORTNESS OF BREATH: 0
NAUSEA: 0
SORE THROAT: 0
COUGH: 0
ABDOMINAL DISTENTION: 0
EYE PAIN: 0

## 2025-06-11 NOTE — PROGRESS NOTES
Negative for color change, pallor and rash.   Neurological:  Negative for speech difficulty, weakness, light-headedness and headaches.   Hematological:  Negative for adenopathy.   Psychiatric/Behavioral:  Negative for agitation, confusion, hallucinations, self-injury and suicidal ideas.           Objective   Physical Exam  Constitutional:       Appearance: Normal appearance.   HENT:      Head: Normocephalic and atraumatic.      Nose: Nose normal.   Eyes:      Extraocular Movements: Extraocular movements intact.      Conjunctiva/sclera: Conjunctivae normal.      Pupils: Pupils are equal, round, and reactive to light.   Cardiovascular:      Rate and Rhythm: Normal rate and regular rhythm.      Pulses: Normal pulses.      Heart sounds: Normal heart sounds.   Pulmonary:      Effort: Pulmonary effort is normal.      Breath sounds: Normal breath sounds.   Abdominal:      General: Abdomen is flat. Bowel sounds are normal.      Palpations: Abdomen is soft.   Skin:     General: Skin is warm and dry.   Neurological:      General: No focal deficit present.      Mental Status: He is alert and oriented to person, place, and time.   Psychiatric:         Mood and Affect: Mood normal.                   An electronic signature was used to authenticate this note.    --Zia Patrick MD

## 2025-06-15 ENCOUNTER — RESULTS FOLLOW-UP (OUTPATIENT)
Dept: FAMILY MEDICINE CLINIC | Facility: CLINIC | Age: 65
End: 2025-06-15